# Patient Record
Sex: FEMALE | Race: WHITE | NOT HISPANIC OR LATINO | Employment: FULL TIME | URBAN - METROPOLITAN AREA
[De-identification: names, ages, dates, MRNs, and addresses within clinical notes are randomized per-mention and may not be internally consistent; named-entity substitution may affect disease eponyms.]

---

## 2017-06-21 ENCOUNTER — COMMUNICATION - HEALTHEAST (OUTPATIENT)
Dept: FAMILY MEDICINE | Facility: CLINIC | Age: 47
End: 2017-06-21

## 2017-06-26 ENCOUNTER — COMMUNICATION - HEALTHEAST (OUTPATIENT)
Dept: FAMILY MEDICINE | Facility: CLINIC | Age: 47
End: 2017-06-26

## 2017-06-26 DIAGNOSIS — E03.9 HYPOTHYROIDISM, UNSPECIFIED TYPE: ICD-10-CM

## 2017-06-26 DIAGNOSIS — F41.1 ANXIETY STATE: ICD-10-CM

## 2018-01-12 ENCOUNTER — COMMUNICATION - HEALTHEAST (OUTPATIENT)
Dept: FAMILY MEDICINE | Facility: CLINIC | Age: 48
End: 2018-01-12

## 2018-01-12 DIAGNOSIS — E03.9 HYPOTHYROIDISM, UNSPECIFIED TYPE: ICD-10-CM

## 2018-01-15 ENCOUNTER — COMMUNICATION - HEALTHEAST (OUTPATIENT)
Dept: FAMILY MEDICINE | Facility: CLINIC | Age: 48
End: 2018-01-15

## 2018-02-08 ENCOUNTER — COMMUNICATION - HEALTHEAST (OUTPATIENT)
Dept: FAMILY MEDICINE | Facility: CLINIC | Age: 48
End: 2018-02-08

## 2018-04-26 ENCOUNTER — OFFICE VISIT - HEALTHEAST (OUTPATIENT)
Dept: FAMILY MEDICINE | Facility: CLINIC | Age: 48
End: 2018-04-26

## 2018-04-26 DIAGNOSIS — Z00.00 ROUTINE GENERAL MEDICAL EXAMINATION AT A HEALTH CARE FACILITY: ICD-10-CM

## 2018-04-26 DIAGNOSIS — F41.1 ANXIETY STATE: ICD-10-CM

## 2018-04-26 DIAGNOSIS — F33.1 MODERATE RECURRENT MAJOR DEPRESSION (H): ICD-10-CM

## 2018-04-26 DIAGNOSIS — E03.9 HYPOTHYROIDISM, UNSPECIFIED TYPE: ICD-10-CM

## 2018-04-26 DIAGNOSIS — Z12.31 VISIT FOR SCREENING MAMMOGRAM: ICD-10-CM

## 2018-04-26 DIAGNOSIS — Z86.39 HISTORY OF VITAMIN D DEFICIENCY: ICD-10-CM

## 2018-04-26 LAB
CHOLEST SERPL-MCNC: 217 MG/DL
FASTING STATUS PATIENT QL REPORTED: YES
FASTING STATUS PATIENT QL REPORTED: YES
GLUCOSE BLD-MCNC: 93 MG/DL (ref 70–125)
HDLC SERPL-MCNC: 86 MG/DL
HGB BLD-MCNC: 13.9 G/DL (ref 12–16)
LDLC SERPL CALC-MCNC: 116 MG/DL
T4 FREE SERPL-MCNC: 1.1 NG/DL (ref 0.7–1.8)
TRIGL SERPL-MCNC: 73 MG/DL
TSH SERPL DL<=0.005 MIU/L-ACNC: 1.48 UIU/ML (ref 0.3–5)

## 2018-04-26 ASSESSMENT — MIFFLIN-ST. JEOR: SCORE: 1137.77

## 2018-04-27 LAB
25(OH)D3 SERPL-MCNC: 34.3 NG/ML (ref 30–80)
25(OH)D3 SERPL-MCNC: 34.3 NG/ML (ref 30–80)
HPV SOURCE: NORMAL
HUMAN PAPILLOMA VIRUS 16 DNA: NEGATIVE
HUMAN PAPILLOMA VIRUS 18 DNA: NEGATIVE
HUMAN PAPILLOMA VIRUS FINAL DIAGNOSIS: NORMAL
HUMAN PAPILLOMA VIRUS OTHER HR: NEGATIVE
SPECIMEN DESCRIPTION: NORMAL

## 2018-05-02 ENCOUNTER — RECORDS - HEALTHEAST (OUTPATIENT)
Dept: ADMINISTRATIVE | Facility: OTHER | Age: 48
End: 2018-05-02

## 2018-05-02 ENCOUNTER — COMMUNICATION - HEALTHEAST (OUTPATIENT)
Dept: FAMILY MEDICINE | Facility: CLINIC | Age: 48
End: 2018-05-02

## 2018-05-02 ENCOUNTER — AMBULATORY - HEALTHEAST (OUTPATIENT)
Dept: FAMILY MEDICINE | Facility: CLINIC | Age: 48
End: 2018-05-02

## 2018-05-02 LAB
BKR LAB AP ABNORMAL BLEEDING: NO
BKR LAB AP BIRTH CONTROL/HORMONES: ABNORMAL
BKR LAB AP CERVICAL APPEARANCE: NORMAL
BKR LAB AP GYN ADEQUACY: ABNORMAL
BKR LAB AP GYN INTERPRETATION: ABNORMAL
BKR LAB AP HPV REFLEX: ABNORMAL
BKR LAB AP LMP: ABNORMAL
BKR LAB AP PATIENT STATUS: ABNORMAL
BKR LAB AP PREVIOUS ABNORMAL: ABNORMAL
BKR LAB AP PREVIOUS NORMAL: 2014
HIGH RISK?: NO
INTERPRETING LAB: ABNORMAL
PATH REPORT.COMMENTS IMP SPEC: ABNORMAL
RESULT FLAG (HE HISTORICAL CONVERSION): ABNORMAL

## 2018-05-07 ENCOUNTER — COMMUNICATION - HEALTHEAST (OUTPATIENT)
Dept: FAMILY MEDICINE | Facility: CLINIC | Age: 48
End: 2018-05-07

## 2018-05-17 ENCOUNTER — COMMUNICATION - HEALTHEAST (OUTPATIENT)
Dept: FAMILY MEDICINE | Facility: CLINIC | Age: 48
End: 2018-05-17

## 2018-05-18 ENCOUNTER — COMMUNICATION - HEALTHEAST (OUTPATIENT)
Dept: FAMILY MEDICINE | Facility: CLINIC | Age: 48
End: 2018-05-18

## 2018-05-21 ENCOUNTER — COMMUNICATION - HEALTHEAST (OUTPATIENT)
Dept: FAMILY MEDICINE | Facility: CLINIC | Age: 48
End: 2018-05-21

## 2018-05-21 DIAGNOSIS — F41.1 ANXIETY STATE: ICD-10-CM

## 2018-05-23 ENCOUNTER — COMMUNICATION - HEALTHEAST (OUTPATIENT)
Dept: FAMILY MEDICINE | Facility: CLINIC | Age: 48
End: 2018-05-23

## 2018-06-06 ENCOUNTER — HOSPITAL ENCOUNTER (OUTPATIENT)
Dept: MAMMOGRAPHY | Facility: CLINIC | Age: 48
Discharge: HOME | End: 2018-06-06
Attending: FAMILY MEDICINE

## 2018-06-06 DIAGNOSIS — Z12.31 VISIT FOR SCREENING MAMMOGRAM: ICD-10-CM

## 2018-06-13 ENCOUNTER — COMMUNICATION - HEALTHEAST (OUTPATIENT)
Dept: FAMILY MEDICINE | Facility: CLINIC | Age: 48
End: 2018-06-13

## 2018-06-29 ENCOUNTER — COMMUNICATION - HEALTHEAST (OUTPATIENT)
Dept: FAMILY MEDICINE | Facility: CLINIC | Age: 48
End: 2018-06-29

## 2018-06-29 DIAGNOSIS — E03.9 HYPOTHYROIDISM, UNSPECIFIED TYPE: ICD-10-CM

## 2018-07-23 ENCOUNTER — COMMUNICATION - HEALTHEAST (OUTPATIENT)
Dept: FAMILY MEDICINE | Facility: CLINIC | Age: 48
End: 2018-07-23

## 2018-10-03 ENCOUNTER — COMMUNICATION - HEALTHEAST (OUTPATIENT)
Dept: FAMILY MEDICINE | Facility: CLINIC | Age: 48
End: 2018-10-03

## 2018-10-03 DIAGNOSIS — E03.9 HYPOTHYROIDISM, UNSPECIFIED TYPE: ICD-10-CM

## 2018-10-03 DIAGNOSIS — F41.1 ANXIETY STATE: ICD-10-CM

## 2019-01-09 ENCOUNTER — COMMUNICATION - HEALTHEAST (OUTPATIENT)
Dept: FAMILY MEDICINE | Facility: CLINIC | Age: 49
End: 2019-01-09

## 2019-01-09 ENCOUNTER — AMBULATORY - HEALTHEAST (OUTPATIENT)
Dept: FAMILY MEDICINE | Facility: CLINIC | Age: 49
End: 2019-01-09

## 2019-01-09 DIAGNOSIS — F41.1 ANXIETY STATE: ICD-10-CM

## 2019-02-22 ENCOUNTER — COMMUNICATION - HEALTHEAST (OUTPATIENT)
Dept: FAMILY MEDICINE | Facility: CLINIC | Age: 49
End: 2019-02-22

## 2019-02-22 DIAGNOSIS — E03.9 HYPOTHYROIDISM, UNSPECIFIED TYPE: ICD-10-CM

## 2019-03-01 ENCOUNTER — COMMUNICATION - HEALTHEAST (OUTPATIENT)
Dept: FAMILY MEDICINE | Facility: CLINIC | Age: 49
End: 2019-03-01

## 2019-03-01 DIAGNOSIS — E03.9 HYPOTHYROIDISM, UNSPECIFIED TYPE: ICD-10-CM

## 2019-08-19 ENCOUNTER — COMMUNICATION - HEALTHEAST (OUTPATIENT)
Dept: FAMILY MEDICINE | Facility: CLINIC | Age: 49
End: 2019-08-19

## 2019-08-19 DIAGNOSIS — F41.1 ANXIETY STATE: ICD-10-CM

## 2019-08-19 DIAGNOSIS — E03.9 HYPOTHYROIDISM, UNSPECIFIED TYPE: ICD-10-CM

## 2019-09-09 ENCOUNTER — OFFICE VISIT - HEALTHEAST (OUTPATIENT)
Dept: FAMILY MEDICINE | Facility: CLINIC | Age: 49
End: 2019-09-09

## 2019-09-09 DIAGNOSIS — E03.9 HYPOTHYROIDISM: ICD-10-CM

## 2019-09-09 DIAGNOSIS — Z11.4 SCREENING FOR HUMAN IMMUNODEFICIENCY VIRUS: ICD-10-CM

## 2019-09-09 DIAGNOSIS — F32.5 MAJOR DEPRESSION IN COMPLETE REMISSION (H): ICD-10-CM

## 2019-09-09 DIAGNOSIS — F41.1 ANXIETY STATE: ICD-10-CM

## 2019-09-09 DIAGNOSIS — N95.1 PERIMENOPAUSE: ICD-10-CM

## 2019-09-09 DIAGNOSIS — Z12.31 VISIT FOR SCREENING MAMMOGRAM: ICD-10-CM

## 2019-09-09 DIAGNOSIS — Z11.59 NEED FOR HEPATITIS C SCREENING TEST: ICD-10-CM

## 2019-09-09 DIAGNOSIS — K58.9 IRRITABLE BOWEL SYNDROME, UNSPECIFIED TYPE: ICD-10-CM

## 2019-09-09 LAB
ALBUMIN SERPL-MCNC: 3.8 G/DL (ref 3.5–5)
ALP SERPL-CCNC: 79 U/L (ref 45–120)
ALT SERPL W P-5'-P-CCNC: 22 U/L (ref 0–45)
ANION GAP SERPL CALCULATED.3IONS-SCNC: 9 MMOL/L (ref 5–18)
AST SERPL W P-5'-P-CCNC: 23 U/L (ref 0–40)
BILIRUB SERPL-MCNC: 0.4 MG/DL (ref 0–1)
BUN SERPL-MCNC: 14 MG/DL (ref 8–22)
CALCIUM SERPL-MCNC: 9.9 MG/DL (ref 8.5–10.5)
CHLORIDE BLD-SCNC: 105 MMOL/L (ref 98–107)
CHOLEST SERPL-MCNC: 231 MG/DL
CO2 SERPL-SCNC: 26 MMOL/L (ref 22–31)
CREAT SERPL-MCNC: 0.84 MG/DL (ref 0.6–1.1)
ERYTHROCYTE [DISTWIDTH] IN BLOOD BY AUTOMATED COUNT: 11.9 % (ref 11–14.5)
FASTING STATUS PATIENT QL REPORTED: YES
GFR SERPL CREATININE-BSD FRML MDRD: >60 ML/MIN/1.73M2
GLUCOSE BLD-MCNC: 88 MG/DL (ref 70–125)
HCT VFR BLD AUTO: 40 % (ref 35–47)
HCV AB SERPL QL IA: NEGATIVE
HDLC SERPL-MCNC: 86 MG/DL
HGB BLD-MCNC: 13.7 G/DL (ref 12–16)
HIV 1+2 AB+HIV1 P24 AG SERPL QL IA: NEGATIVE
LDLC SERPL CALC-MCNC: 128 MG/DL
MCH RBC QN AUTO: 32.6 PG (ref 27–34)
MCHC RBC AUTO-ENTMCNC: 34.2 G/DL (ref 32–36)
MCV RBC AUTO: 95 FL (ref 80–100)
PLATELET # BLD AUTO: 254 THOU/UL (ref 140–440)
PMV BLD AUTO: 8.3 FL (ref 7–10)
POTASSIUM BLD-SCNC: 4.4 MMOL/L (ref 3.5–5)
PROT SERPL-MCNC: 6.9 G/DL (ref 6–8)
RBC # BLD AUTO: 4.2 MILL/UL (ref 3.8–5.4)
SODIUM SERPL-SCNC: 140 MMOL/L (ref 136–145)
TRIGL SERPL-MCNC: 83 MG/DL
TSH SERPL DL<=0.005 MIU/L-ACNC: 2.6 UIU/ML (ref 0.3–5)
WBC: 6.6 THOU/UL (ref 4–11)

## 2019-09-09 RX ORDER — DICYCLOMINE HYDROCHLORIDE 10 MG/1
CAPSULE ORAL
Qty: 56 CAPSULE | Refills: 3 | Status: SHIPPED | OUTPATIENT
Start: 2019-09-09 | End: 2021-12-22

## 2019-09-09 ASSESSMENT — ANXIETY QUESTIONNAIRES
7. FEELING AFRAID AS IF SOMETHING AWFUL MIGHT HAPPEN: NOT AT ALL
GAD7 TOTAL SCORE: 1
6. BECOMING EASILY ANNOYED OR IRRITABLE: NOT AT ALL
3. WORRYING TOO MUCH ABOUT DIFFERENT THINGS: NOT AT ALL
1. FEELING NERVOUS, ANXIOUS, OR ON EDGE: NOT AT ALL
2. NOT BEING ABLE TO STOP OR CONTROL WORRYING: NOT AT ALL
5. BEING SO RESTLESS THAT IT IS HARD TO SIT STILL: NOT AT ALL
IF YOU CHECKED OFF ANY PROBLEMS ON THIS QUESTIONNAIRE, HOW DIFFICULT HAVE THESE PROBLEMS MADE IT FOR YOU TO DO YOUR WORK, TAKE CARE OF THINGS AT HOME, OR GET ALONG WITH OTHER PEOPLE: NOT DIFFICULT AT ALL
4. TROUBLE RELAXING: SEVERAL DAYS

## 2019-09-09 ASSESSMENT — PATIENT HEALTH QUESTIONNAIRE - PHQ9
SUM OF ALL RESPONSES TO PHQ QUESTIONS 1-9: 2
SUM OF ALL RESPONSES TO PHQ QUESTIONS 1-9: 0

## 2019-09-09 ASSESSMENT — MIFFLIN-ST. JEOR: SCORE: 1190.79

## 2019-09-10 ENCOUNTER — COMMUNICATION - HEALTHEAST (OUTPATIENT)
Dept: FAMILY MEDICINE | Facility: CLINIC | Age: 49
End: 2019-09-10

## 2019-09-10 DIAGNOSIS — E03.9 ACQUIRED HYPOTHYROIDISM: ICD-10-CM

## 2019-10-04 ENCOUNTER — HOSPITAL ENCOUNTER (OUTPATIENT)
Dept: MAMMOGRAPHY | Facility: CLINIC | Age: 49
Discharge: HOME | End: 2019-10-04
Attending: FAMILY MEDICINE

## 2019-10-04 DIAGNOSIS — Z12.31 VISIT FOR SCREENING MAMMOGRAM: ICD-10-CM

## 2019-10-13 ENCOUNTER — COMMUNICATION - HEALTHEAST (OUTPATIENT)
Dept: FAMILY MEDICINE | Facility: CLINIC | Age: 49
End: 2019-10-13

## 2019-10-17 ENCOUNTER — COMMUNICATION - HEALTHEAST (OUTPATIENT)
Dept: FAMILY MEDICINE | Facility: CLINIC | Age: 49
End: 2019-10-17

## 2019-10-17 DIAGNOSIS — F41.1 ANXIETY STATE: ICD-10-CM

## 2019-10-29 ENCOUNTER — COMMUNICATION - HEALTHEAST (OUTPATIENT)
Dept: FAMILY MEDICINE | Facility: CLINIC | Age: 49
End: 2019-10-29

## 2019-10-29 DIAGNOSIS — E03.9 HYPOTHYROIDISM, UNSPECIFIED TYPE: ICD-10-CM

## 2019-11-14 ENCOUNTER — AMBULATORY - HEALTHEAST (OUTPATIENT)
Dept: LAB | Facility: CLINIC | Age: 49
End: 2019-11-14

## 2019-11-14 DIAGNOSIS — E03.9 ACQUIRED HYPOTHYROIDISM: ICD-10-CM

## 2019-11-14 LAB
T4 FREE SERPL-MCNC: 0.9 NG/DL (ref 0.7–1.8)
TSH SERPL DL<=0.005 MIU/L-ACNC: 2.1 UIU/ML (ref 0.3–5)

## 2019-12-03 ENCOUNTER — COMMUNICATION - HEALTHEAST (OUTPATIENT)
Dept: FAMILY MEDICINE | Facility: CLINIC | Age: 49
End: 2019-12-03

## 2020-01-23 ENCOUNTER — COMMUNICATION - HEALTHEAST (OUTPATIENT)
Dept: FAMILY MEDICINE | Facility: CLINIC | Age: 50
End: 2020-01-23

## 2020-01-30 ENCOUNTER — OFFICE VISIT - HEALTHEAST (OUTPATIENT)
Dept: FAMILY MEDICINE | Facility: CLINIC | Age: 50
End: 2020-01-30

## 2020-01-30 DIAGNOSIS — K58.9 IRRITABLE BOWEL SYNDROME, UNSPECIFIED TYPE: ICD-10-CM

## 2020-01-30 DIAGNOSIS — Z79.899 HIGH RISK MEDICATION USE: ICD-10-CM

## 2020-01-30 DIAGNOSIS — M54.9 BACK PAIN, UNSPECIFIED BACK LOCATION, UNSPECIFIED BACK PAIN LATERALITY, UNSPECIFIED CHRONICITY: ICD-10-CM

## 2020-01-30 DIAGNOSIS — F41.1 ANXIETY STATE: ICD-10-CM

## 2020-01-30 DIAGNOSIS — R53.82 CHRONIC FATIGUE: ICD-10-CM

## 2020-01-30 DIAGNOSIS — E03.9 ACQUIRED HYPOTHYROIDISM: ICD-10-CM

## 2020-01-30 LAB
ALBUMIN SERPL-MCNC: 3.9 G/DL (ref 3.5–5)
ALP SERPL-CCNC: 60 U/L (ref 45–120)
ALT SERPL W P-5'-P-CCNC: 13 U/L (ref 0–45)
AST SERPL W P-5'-P-CCNC: 17 U/L (ref 0–40)
BILIRUB DIRECT SERPL-MCNC: 0.1 MG/DL
BILIRUB SERPL-MCNC: 0.4 MG/DL (ref 0–1)
PROT SERPL-MCNC: 7.2 G/DL (ref 6–8)
T4 FREE SERPL-MCNC: 0.8 NG/DL (ref 0.7–1.8)
TSH SERPL DL<=0.005 MIU/L-ACNC: 5.81 UIU/ML (ref 0.3–5)

## 2020-01-30 ASSESSMENT — ANXIETY QUESTIONNAIRES
7. FEELING AFRAID AS IF SOMETHING AWFUL MIGHT HAPPEN: NOT AT ALL
1. FEELING NERVOUS, ANXIOUS, OR ON EDGE: MORE THAN HALF THE DAYS
4. TROUBLE RELAXING: MORE THAN HALF THE DAYS
5. BEING SO RESTLESS THAT IT IS HARD TO SIT STILL: MORE THAN HALF THE DAYS
6. BECOMING EASILY ANNOYED OR IRRITABLE: SEVERAL DAYS
IF YOU CHECKED OFF ANY PROBLEMS ON THIS QUESTIONNAIRE, HOW DIFFICULT HAVE THESE PROBLEMS MADE IT FOR YOU TO DO YOUR WORK, TAKE CARE OF THINGS AT HOME, OR GET ALONG WITH OTHER PEOPLE: SOMEWHAT DIFFICULT
3. WORRYING TOO MUCH ABOUT DIFFERENT THINGS: MORE THAN HALF THE DAYS
GAD7 TOTAL SCORE: 10
2. NOT BEING ABLE TO STOP OR CONTROL WORRYING: SEVERAL DAYS

## 2020-01-30 ASSESSMENT — PATIENT HEALTH QUESTIONNAIRE - PHQ9: SUM OF ALL RESPONSES TO PHQ QUESTIONS 1-9: 12

## 2020-01-31 LAB
25(OH)D3 SERPL-MCNC: 55.9 NG/ML (ref 30–80)
25(OH)D3 SERPL-MCNC: 55.9 NG/ML (ref 30–80)

## 2020-02-05 ENCOUNTER — AMBULATORY - HEALTHEAST (OUTPATIENT)
Dept: FAMILY MEDICINE | Facility: CLINIC | Age: 50
End: 2020-02-05

## 2020-02-05 DIAGNOSIS — E03.9 ACQUIRED HYPOTHYROIDISM: ICD-10-CM

## 2020-02-07 ENCOUNTER — COMMUNICATION - HEALTHEAST (OUTPATIENT)
Dept: FAMILY MEDICINE | Facility: CLINIC | Age: 50
End: 2020-02-07

## 2020-02-07 DIAGNOSIS — K58.9 IRRITABLE BOWEL SYNDROME, UNSPECIFIED TYPE: ICD-10-CM

## 2020-02-07 DIAGNOSIS — F41.1 ANXIETY STATE: ICD-10-CM

## 2020-02-07 DIAGNOSIS — M54.9 BACK PAIN, UNSPECIFIED BACK LOCATION, UNSPECIFIED BACK PAIN LATERALITY, UNSPECIFIED CHRONICITY: ICD-10-CM

## 2020-02-07 DIAGNOSIS — E03.9 ACQUIRED HYPOTHYROIDISM: ICD-10-CM

## 2020-02-07 DIAGNOSIS — R53.82 CHRONIC FATIGUE: ICD-10-CM

## 2020-03-15 ENCOUNTER — VIRTUAL VISIT (OUTPATIENT)
Dept: FAMILY MEDICINE | Facility: OTHER | Age: 50
End: 2020-03-15

## 2020-03-15 ENCOUNTER — OFFICE VISIT - HEALTHEAST (OUTPATIENT)
Dept: FAMILY MEDICINE | Facility: CLINIC | Age: 50
End: 2020-03-15

## 2020-03-15 DIAGNOSIS — R05.9 COUGH: ICD-10-CM

## 2020-03-19 NOTE — PROGRESS NOTES
"Date: 03/15/2020 13:38:17  Clinician: Luis Alfredo Rasmussen  Clinician NPI: [id number]  Patient: Sil Mancuso  Patient : 1970  Patient Address: [address] [place] [address]  Patient Phone: [phone]  Visit Protocol: URI  Patient Summary:  Sil is a 49 year old ( : 1970 ) female who initiated a Visit for COVID-19 (Coronavirus) evaluation and screening. When asked the question \"Please sign me up to receive news, health information and promotions. \", Sil responded \"No\".    Sil states her symptoms started today.   Her symptoms consist of malaise, a headache, rhinitis, myalgia, a sore throat, a cough, and nasal congestion.   Symptom details     Nasal secretions: The color of her mucus is yellow and clear.    Cough: Sil coughs a few times an hour and her cough is more bothersome at night. Phlegm does not come into her throat when she coughs. She does not believe her cough is caused by post-nasal drip.     Sore throat: Sil reports having mild throat pain (1-3 on a 10 point pain scale), does not have exudate on her tonsils, and can swallow liquids. The lymph nodes in her neck are not enlarged. A rash has not appeared on the skin since the sore throat started.     Headache: She states the headache is mild (1-3 on a 10 point pain scale).      Sil denies having ear pain, fever, enlarged lymph nodes, facial pain or pressure, chills, wheezing, and teeth pain. She also denies having a sinus infection within the past year, having recent facial or sinus surgery in the past 60 days, and taking antibiotic medication for the symptoms. She is not experiencing dyspnea.   Precipitating events  Sil is not sure if she has been exposed to someone with strep throat. She has recently been exposed to someone with influenza. Sil has not been in close contact with any high risk individuals.   Pertinent COVID-19 (Coronavirus) information  Sil has traveled internationally or to the areas where " COVID-19 (Coronavirus) is widespread in the last 14 days before the start of her symptoms. Countries or locations traveled as reported by the patient (free text): On a cruise to [facility] and [facility]   Sil has had close contact with a suspected or laboratory-confirmed COVID-19 patient within 14 days of symptom onset.   Sil is not a healthcare worker and does not work in a healthcare facility.   Pertinent medical history  Sil does not get yeast infections when she takes antibiotics.   Sil does not need a return to work/school note.   Weight: 125 lbs   Sil does not smoke or use smokeless tobacco.   She denies pregnancy and denies breastfeeding. She does not menstruate.   Weight: 125 lbs    MEDICATIONS: Synthroid oral, Zoloft oral, ALLERGIES: NKDA  Clinician Response:  Dear Sil,      Dear Sil Mancuso,  Based on the information you have provided, it is recommended that you go to one of our designated Corona Virus 19 testing centers to get a test done from your car. To do this follow these instructions:  You should go to one of our dedicated testing centers as soon as possible during the hours below at one of these locations:   Walk-in Care: [facility] at [address] [address], [place] [address]. Hours: M-F 7am - 6pm, Sat-Sun 8am -- 3pm   [facility] at [address] [address]. Hours: Every Day 9am -- 7pm  Walk-in Care: [facility] at [address] [place] [address]. Hours: M-F 7am - 6pm, Sat-Sun 8am -- 3pm  [address] [place] [address]. Hours: M-F 11am -- 7pm, Sat-Sun 9am-4pm   What to expect:   When you arrive please come park in the parking lot.  Call [phone] and let them know which of the four clinics you are at, description of your car and where you are parked. Mention you did an [facility] visit and were sent for testing.  They will add you to the queue to get your test  (you will stay in your car the entire time).  On that phone call you will give them the information to register your for the visit.  You will then be met by a provider who will perform a brief assessment in your car and collect samples to send for Corona Virus 19, influenza and possibly RSV.  You will be given patient information about respiratory illnesses and instructions. You with the results if you are not on mychart.   Isolate Yourself:   Isolate yourself while traveling.  Do Not allow any visitors within 6 feet.  Do Not go to work or school.  Do Not go to Religion,  centers, shopping, or other public places.  Do Not shake hands.  Avoid close contact with others (hugging, kissing).  Protect Others:  Cover Your Mouth and Nose with a mask, disposable tissue or wash cloth to avoid spreading germs to others.  Wash your hands and face frequently with soap and water   Fever Medicines:   For fever relief, take acetaminophen or ibuprofen.  Treat fevers above 101deg F (38.3deg C) to lower fevers and make you more comfortable.   Acetaminophen (e.g., Tylenol): Take 650 mg (two 325 mg pills) by mouth every 4-6 hours as needed of regular strength Tylenol or 1,000 mg (two 500 mg pills) every 8 hours as needed of Extra Strength Tylenol.   Ibuprofen (e.g., Motrin, Advil): Take 400 mg (two 200 mg pills) by mouth every 6 hours as needed.   Acetaminophen is thought to be safer than ibuprofen or naproxen for people over 65 years old. Acetaminophen is in many OTC and prescription medicines. It might be in more than one medicine that you are taking. You need to be careful and not take an overdose. Before taking any medicine, read all the instructions on the package.  Caution -NSAIDs (e.g., ibuprofen, naproxen): Do not take nonsteroidal anti-inflammatory drugs (NSAIDs) if you have stomach problems, kidney disease, heart failure, or other contraindications to using this type of medicine. Do not take NSAID medicines for over 7  days without consulting your PCP. Do not take NSAID medicines if you are pregnant. Do not take NSAID medicines if you are also taking blood thinners.   Call Back If: Breathing difficulty develops or you become worse.  Thank you for limiting contact with others, wearing a simple mask to cover your cough, practice good hand hygiene habits and accessing our virtual services where possible to limit the spread of this virus.  For more information about COVID19 and options for caring for yourself at home, please visit the CDC website at https://www.cdc.gov/coronavirus/2019-ncov/about/steps-when-sick.html  For more options for care at [facility], please visit our website at [email or web address]    COVID-19 (Coronavirus) General Information  With the increase in the number of COVID-19 (Coronavirus) cases, we understand you may have some questions. Below is some helpful information on COVID-19 (Coronavirus).  How can I protect myself and others from the COVID-19 (Coronavirus)?  Because there is currently no vaccine to prevent infection, the best way to protect yourself is to avoid being exposed to this virus. Put distance between yourself and other people if COVID-19 (Coronavirus) is spreading in your community. The virus is thought to spread mainly from person-to-person.     Between people who are in close contact with one another (within about 6 about) for prolonged period (10 minutes or longer).    Through respiratory droplets produced when an infected person coughs or sneezes.     The CDC recommends the following additional steps to protect yourself and others:     Wash your hands often with soap and water for at least 20 seconds, especially after blowing your nose, coughing, or sneezing; going to the bathroom; and before eating or preparing food.  Use an alcohol-based hand  that contains at least 60 percent alcohol if soap and water are not available.        Avoid touching  your eyes, nose and mouth with unwashed hands.    Avoid close contact with people who are sick.    Stay home when you are sick.    Cover your cough or sneeze with a tissue, then throw the tissue in the trash.    Clean and disinfect frequently touched objects and surfaces.     You can help stop COVID-19 (Coronavirus) by knowing the signs and symptoms:     Fever    Cough    Shortness of breath     Contact your healthcare provider if   Develop symptoms   AND   Have been in close contact with a person known to have COVID-19 (Coronavirus) or live in or have recently traveled from an area with ongoing spread of COVID-19 (Coronavirus). Call ahead before you go to a doctor's office or emergency room. Tell them about your recent travel and your symptoms.   For the most up to date information, visit the CDC's website.  Steps to help prevent the spread of COVID-19 (Coronavirus) if you are sick  If you are sick with COVID-19 (Coronavirus) or suspect you are infected with the virus that causes COVID-19 (Coronavirus), follow the steps below to help prevent the disease from spreading&nbsp;to people in your home and community.     Stay home except to get medical care. Home isolation may be started in consultation with your healthcare clinician.    Separate yourself from other people and animals in your home.    Call ahead before visiting your doctor if you have a medical appointment.    Wear a facemask when you are around other people.    Cover your cough and sneezes.    Clean your hands often.    Avoid sharing personal household items.    Clean and disinfect frequently touched objects and surfaces everyday.    You will need to have someone drop off medications or household supplies (if needed) at your house without coming inside or in contact with you or others living in your house.    Monitor your symptoms and seek prompt medical care if your illness is worsening (e.g. Difficulty breathing).    Discontinue home isolation only in  "consultation with your healthcare provider.     For more detailed and up to date information on what to do if you are sick, visit this link: What to Do If You Are Sick With Coronavirus Disease 2019 (COVID-19).  Do I need to be tested for COVID-19 (Coronavirus)?     At this time, the limited number of tests available are controlled by the state and local health departments and are being reserved for more seriously ill patients, those with known exposure to confirmed patients, and those with recent travel (within 14 days) to countries with high rates of COVID-19 (Coronavirus).    Decisions on which patients receive testing will be based on the local spread of COVID-19 (Coronavirus) as well as the symptoms. Your healthcare provider will make the final decision on whether you should be tested.    In the meantime, if you have concerns that you may have been exposed, it is reasonable to practice \"social distancing.\"&nbsp; If you are ill with a cold or flu-like illness, please monitor your symptoms and reach out to your healthcare provider if your symptoms worsen.    For more up to date information, visit this link: COVID-19 (Coronavirus) Frequently Asked Questions and Answers.      Diagnosis: Cough  Diagnosis ICD: R05  "

## 2020-03-20 ENCOUNTER — COMMUNICATION - HEALTHEAST (OUTPATIENT)
Dept: FAMILY MEDICINE | Facility: CLINIC | Age: 50
End: 2020-03-20

## 2020-03-20 LAB
COVID-19 VIRUS PCR RESULT FROM MDH: NEGATIVE
SPECIMEN STATUS: NORMAL

## 2020-03-26 ENCOUNTER — COMMUNICATION - HEALTHEAST (OUTPATIENT)
Dept: FAMILY MEDICINE | Facility: CLINIC | Age: 50
End: 2020-03-26

## 2020-03-26 DIAGNOSIS — E03.9 HYPOTHYROIDISM, UNSPECIFIED TYPE: ICD-10-CM

## 2020-07-01 ENCOUNTER — COMMUNICATION - HEALTHEAST (OUTPATIENT)
Dept: FAMILY MEDICINE | Facility: CLINIC | Age: 50
End: 2020-07-01

## 2020-07-01 DIAGNOSIS — E03.9 ACQUIRED HYPOTHYROIDISM: ICD-10-CM

## 2020-09-02 ENCOUNTER — COMMUNICATION - HEALTHEAST (OUTPATIENT)
Dept: FAMILY MEDICINE | Facility: CLINIC | Age: 50
End: 2020-09-02

## 2020-09-02 DIAGNOSIS — Z78.9 USES BIRTH CONTROL: ICD-10-CM

## 2020-09-21 ENCOUNTER — COMMUNICATION - HEALTHEAST (OUTPATIENT)
Dept: FAMILY MEDICINE | Facility: CLINIC | Age: 50
End: 2020-09-21

## 2020-09-21 DIAGNOSIS — F41.1 ANXIETY STATE: ICD-10-CM

## 2020-10-30 ENCOUNTER — OFFICE VISIT - HEALTHEAST (OUTPATIENT)
Dept: FAMILY MEDICINE | Facility: CLINIC | Age: 50
End: 2020-10-30

## 2020-10-30 DIAGNOSIS — K58.9 IRRITABLE BOWEL SYNDROME, UNSPECIFIED TYPE: ICD-10-CM

## 2020-10-30 DIAGNOSIS — F33.0 MILD RECURRENT MAJOR DEPRESSION (H): ICD-10-CM

## 2020-10-30 DIAGNOSIS — Z12.31 VISIT FOR SCREENING MAMMOGRAM: ICD-10-CM

## 2020-10-30 DIAGNOSIS — Z00.00 ROUTINE GENERAL MEDICAL EXAMINATION AT A HEALTH CARE FACILITY: ICD-10-CM

## 2020-10-30 DIAGNOSIS — E03.9 ACQUIRED HYPOTHYROIDISM: ICD-10-CM

## 2020-10-30 DIAGNOSIS — F41.1 ANXIETY STATE: ICD-10-CM

## 2020-10-30 LAB
CHOLEST SERPL-MCNC: 257 MG/DL
FASTING STATUS PATIENT QL REPORTED: NO
FASTING STATUS PATIENT QL REPORTED: NO
GLUCOSE BLD-MCNC: 84 MG/DL (ref 70–125)
HDLC SERPL-MCNC: 92 MG/DL
LDLC SERPL CALC-MCNC: 141 MG/DL
T4 FREE SERPL-MCNC: 0.8 NG/DL (ref 0.7–1.8)
TRIGL SERPL-MCNC: 121 MG/DL
TSH SERPL DL<=0.005 MIU/L-ACNC: 10.61 UIU/ML (ref 0.3–5)

## 2020-10-30 ASSESSMENT — MIFFLIN-ST. JEOR: SCORE: 1180.31

## 2020-10-30 ASSESSMENT — PATIENT HEALTH QUESTIONNAIRE - PHQ9: SUM OF ALL RESPONSES TO PHQ QUESTIONS 1-9: 5

## 2020-11-01 ENCOUNTER — COMMUNICATION - HEALTHEAST (OUTPATIENT)
Dept: FAMILY MEDICINE | Facility: CLINIC | Age: 50
End: 2020-11-01

## 2020-11-01 DIAGNOSIS — Z78.9 USES BIRTH CONTROL: ICD-10-CM

## 2020-11-02 ENCOUNTER — COMMUNICATION - HEALTHEAST (OUTPATIENT)
Dept: FAMILY MEDICINE | Facility: CLINIC | Age: 50
End: 2020-11-02

## 2020-11-02 ENCOUNTER — AMBULATORY - HEALTHEAST (OUTPATIENT)
Dept: FAMILY MEDICINE | Facility: CLINIC | Age: 50
End: 2020-11-02

## 2020-11-02 DIAGNOSIS — E03.9 ACQUIRED HYPOTHYROIDISM: ICD-10-CM

## 2020-11-04 RX ORDER — ETONOGESTREL AND ETHINYL ESTRADIOL VAGINAL RING .015; .12 MG/D; MG/D
RING VAGINAL
Qty: 3 EACH | Refills: 3 | Status: SHIPPED | OUTPATIENT
Start: 2020-11-04 | End: 2021-09-02

## 2020-12-10 ENCOUNTER — COMMUNICATION - HEALTHEAST (OUTPATIENT)
Dept: FAMILY MEDICINE | Facility: CLINIC | Age: 50
End: 2020-12-10

## 2020-12-10 ENCOUNTER — OFFICE VISIT - HEALTHEAST (OUTPATIENT)
Dept: FAMILY MEDICINE | Facility: CLINIC | Age: 50
End: 2020-12-10

## 2020-12-10 DIAGNOSIS — F51.01 PRIMARY INSOMNIA: ICD-10-CM

## 2020-12-18 ENCOUNTER — HOSPITAL ENCOUNTER (OUTPATIENT)
Dept: MAMMOGRAPHY | Facility: CLINIC | Age: 50
Discharge: HOME | End: 2020-12-18
Attending: FAMILY MEDICINE

## 2020-12-18 DIAGNOSIS — Z12.31 VISIT FOR SCREENING MAMMOGRAM: ICD-10-CM

## 2020-12-23 ENCOUNTER — COMMUNICATION - HEALTHEAST (OUTPATIENT)
Dept: FAMILY MEDICINE | Facility: CLINIC | Age: 50
End: 2020-12-23

## 2020-12-23 DIAGNOSIS — F51.01 PRIMARY INSOMNIA: ICD-10-CM

## 2020-12-23 RX ORDER — HYDROXYZINE HYDROCHLORIDE 25 MG/1
TABLET, FILM COATED ORAL
Qty: 180 TABLET | Refills: 2 | Status: SHIPPED | OUTPATIENT
Start: 2020-12-23 | End: 2021-09-10

## 2021-02-08 ENCOUNTER — AMBULATORY - HEALTHEAST (OUTPATIENT)
Dept: LAB | Facility: CLINIC | Age: 51
End: 2021-02-08

## 2021-02-08 DIAGNOSIS — E03.9 ACQUIRED HYPOTHYROIDISM: ICD-10-CM

## 2021-02-08 LAB
T4 FREE SERPL-MCNC: 1 NG/DL (ref 0.7–1.8)
TSH SERPL DL<=0.005 MIU/L-ACNC: 1.77 UIU/ML (ref 0.3–5)

## 2021-02-09 ENCOUNTER — COMMUNICATION - HEALTHEAST (OUTPATIENT)
Dept: FAMILY MEDICINE | Facility: CLINIC | Age: 51
End: 2021-02-09

## 2021-02-09 DIAGNOSIS — E03.9 ACQUIRED HYPOTHYROIDISM: ICD-10-CM

## 2021-02-09 DIAGNOSIS — F51.01 PRIMARY INSOMNIA: ICD-10-CM

## 2021-02-09 RX ORDER — HYDROXYZINE PAMOATE 50 MG/1
50 CAPSULE ORAL
Qty: 90 CAPSULE | Refills: 3 | Status: SHIPPED | OUTPATIENT
Start: 2021-02-09 | End: 2022-02-14

## 2021-02-09 RX ORDER — LEVOTHYROXINE SODIUM 150 UG/1
150 TABLET ORAL DAILY
Qty: 90 TABLET | Refills: 3 | Status: SHIPPED | OUTPATIENT
Start: 2021-02-09 | End: 2022-02-22

## 2021-03-02 ENCOUNTER — COMMUNICATION - HEALTHEAST (OUTPATIENT)
Dept: FAMILY MEDICINE | Facility: CLINIC | Age: 51
End: 2021-03-02

## 2021-03-02 DIAGNOSIS — F41.1 ANXIETY STATE: ICD-10-CM

## 2021-03-03 RX ORDER — SERTRALINE HYDROCHLORIDE 100 MG/1
TABLET, FILM COATED ORAL
Qty: 135 TABLET | Refills: 2 | Status: SHIPPED | OUTPATIENT
Start: 2021-03-03 | End: 2021-11-07

## 2021-05-26 ENCOUNTER — RECORDS - HEALTHEAST (OUTPATIENT)
Dept: ADMINISTRATIVE | Facility: CLINIC | Age: 51
End: 2021-05-26

## 2021-05-26 ASSESSMENT — PATIENT HEALTH QUESTIONNAIRE - PHQ9: SUM OF ALL RESPONSES TO PHQ QUESTIONS 1-9: 2

## 2021-05-27 ASSESSMENT — PATIENT HEALTH QUESTIONNAIRE - PHQ9
SUM OF ALL RESPONSES TO PHQ QUESTIONS 1-9: 12
SUM OF ALL RESPONSES TO PHQ QUESTIONS 1-9: 5

## 2021-05-28 ENCOUNTER — RECORDS - HEALTHEAST (OUTPATIENT)
Dept: ADMINISTRATIVE | Facility: CLINIC | Age: 51
End: 2021-05-28

## 2021-05-28 ASSESSMENT — ANXIETY QUESTIONNAIRES
GAD7 TOTAL SCORE: 1
GAD7 TOTAL SCORE: 10

## 2021-05-31 NOTE — TELEPHONE ENCOUNTER
RN cannot approve Refill Request    RN can NOT refill this medication Protocol failed and NO refill given. Last office visit: 12/14/2016 Irma Lerner MD Last Physical: 4/26/2018 Last MTM visit: Visit date not found Last visit same specialty: 12/14/2016 Irma Lerner MD.  Next visit within 3 mo: Visit date not found  Next physical within 3 mo: Visit date not found      [place], [facility] Triage/Med Refill 8/19/2019    Requested Prescriptions   Pending Prescriptions Disp Refills     sertraline (ZOLOFT) 100 MG tablet [Pharmacy Med Name: SERTRALINE HCL 100MG TABLET] 135 tablet 0     Sig: TAKE 1 AND 1/2 TABLETS BY  MOUTH DAILY       [facility] Refill Protocol  Failed - 8/19/2019  7:39 AM        Failed - PCP or prescribing provider visit in last year     Last office visit with prescriber/PCP: 12/14/2016 Irma Lerner MD OR same dept: Visit date not found OR same specialty: 12/14/2016 Irma Lerner MD  Last physical: 4/26/2018 Last MTM visit: Visit date not found   Next visit within 3 mo: Visit date not found  Next physical within 3 mo: Visit date not found  Prescriber OR PCP: Irma Lerner MD  Last diagnosis associated with med order: 1. Anxiety  - sertraline (ZOLOFT) 100 MG tablet [Pharmacy Med Name: SERTRALINE HCL 100MG TABLET]; TAKE 1 AND 1/2 TABLETS BY  MOUTH DAILY  Dispense: 135 tablet; Refill: 0    2. Hypothyroidism, unspecified type  - SYNTHROID 112 mcg tablet [Pharmacy Med Name: SYNTHROID  0.112MG  TAB]; TAKE 1 TABLET BY MOUTH  DAILY  Dispense: 90 tablet; Refill: 0    If protocol passes may refill for 12 months if within 3 months of last provider visit (or a total of 15 months).             SYNTHROID 112 mcg tablet [Pharmacy Med Name: SYNTHROID  0.112MG  TAB] 90 tablet 0     Sig: TAKE 1 TABLET BY MOUTH  DAILY       Thyroid Hormones Protocol Failed - 8/19/2019  7:39 AM        Failed - Provider visit in past 12 months or next 3 months     Last office visit with prescriber/PCP: 12/14/2016 Yann  MD Irma OR same dept: Visit date not found OR same specialty: 12/14/2016 Irma Lerner MD  Last physical: 4/26/2018 Last MTM visit: Visit date not found   Next visit within 3 mo: Visit date not found  Next physical within 3 mo: Visit date not found  Prescriber OR PCP: Irma Lernre MD  Last diagnosis associated with med order: 1. Anxiety  - sertraline (ZOLOFT) 100 MG tablet [Pharmacy Med Name: SERTRALINE HCL 100MG TABLET]; TAKE 1 AND 1/2 TABLETS BY  MOUTH DAILY  Dispense: 135 tablet; Refill: 0    2. Hypothyroidism, unspecified type  - SYNTHROID 112 mcg tablet [Pharmacy Med Name: SYNTHROID  0.112MG  TAB]; TAKE 1 TABLET BY MOUTH  DAILY  Dispense: 90 tablet; Refill: 0    If protocol passes may refill for 12 months if within 3 months of last provider visit (or a total of 15 months).             Failed - TSH on file in past 12 months for patient age 12 & older     TSH   Date Value Ref Range Status   04/26/2018 1.48 0.30 - 5.00 uIU/mL Final

## 2021-05-31 NOTE — TELEPHONE ENCOUNTER
Refills sent. Pt is due for med check and labs. Please call to schedule.  She will need this before further refills can be sent

## 2021-06-01 VITALS — BODY MASS INDEX: 22.5 KG/M2 | WEIGHT: 122.25 LBS | HEIGHT: 62 IN

## 2021-06-01 NOTE — PROGRESS NOTES
Assessment/Plan:     1. Hypothyroidism  Thyroid Stimulating Hormone (TSH)    Lipid Cascade FASTING   2. Visit for screening mammogram  Mammo Screening Bilateral   3. Screening for human immunodeficiency virus  HIV Antigen/Antibody Screening Cascade   4. Contraception  etonogestrel-ethinyl estradiol (NUVARING) 0.12-0.015 mg/24 hr vaginal ring   5. Need for hepatitis C screening test  Hepatitis C Antibody (Anti-HCV)   6. Irritable bowel syndrome, unspecified type  Comprehensive Metabolic Panel    HM2(CBC w/o Differential)    dicyclomine (BENTYL) 10 MG capsule   7. Perimenopause     8. Anxiety     9. Major depression in complete remission (H)         Diagnoses and all orders for this visit:    Hypothyroidism  -     Thyroid Stimulating Hormone (TSH)  -     Lipid Cascade FASTING  -Continue Synthroid.  Will adjust dose if needed based on results    Visit for screening mammogram  -     Mammo Screening Bilateral; Future; Expected date: 09/09/2019    Screening for human immunodeficiency virus  -     HIV Antigen/Antibody Screening Cascade    Contraception  -     etonogestrel-ethinyl estradiol (NUVARING) 0.12-0.015 mg/24 hr vaginal ring; INSERT 1 RING VAGINALLY  EVERY 28 DAYS; LEAVE RING  IN PLACE FOR 3 CONSECUTIVE  WEEKS, THEN REMOVE FOR 1  WEEK.  Dispense: 3 each; Refill: 3  - Doing well on current form of contraception    Need for hepatitis C screening test  -     Hepatitis C Antibody (Anti-HCV)    Irritable bowel syndrome, unspecified type  -     Comprehensive Metabolic Panel  -     HM2(CBC w/o Differential)  -     dicyclomine (BENTYL) 10 MG capsule; Take 1 capsule by mouth every 6 hours for up to 14 days  Dispense: 56 capsule; Refill: 3  -We will check TSH and adjust dose of Synthroid if needed.  Continue with healthy diet and regular exercise.  Try prescription for dicyclomine on as-needed basis for flareups of IBS.  Check hemogram and comprehensive metabolic panel today.  Consider follow-up with GI specialist in  future if needed    Perimenopause  Counseled on common symptoms associated with perimenopause    Anxiety  Doing well on sertraline 150 mg daily    Major depression in complete remission (H)  Doing well on sertraline 150 mg daily    Need for influenza vaccine  -     Influenza,Seasonal,Quad,INJ =/>6months  -Counseled on vaccine and side effects             The following are part of a depression follow up plan for the patient:  mental health care management    Subjective:      Sil Mancuso is a 48 y.o. female who comes in today for medication check and refills.  Reviewed her health history.  No significant changes in her health since she was last seen in April 2018.  She is due for mammogram.  She has hypothyroidism.  She continues on Synthroid.  She has history of anxiety, depression and irritable bowel syndrome.  She is on sertraline 150 mg daily for anxiety and depression.  This was a medication change that we made since her last visit based on the results of iNEWiT testing.  She reports that the sertraline is working very well for her.  She is comfortable at her current dose and would like to continue this.  No adverse side effects.  States that life is going well.  She enjoys her job.  She is  and her marriage is fine.  Children are all doing well.  She has been experiencing more flareups of irritable bowel syndrome over the past 6 months and she is not really sure why.  She started a probiotic supplement about 4 5 years ago and that has really made a difference as far as controlling her IBS symptoms.  She does have heartburn and acid reflux.  She has not had blood in stools but stools have been a little dark in appearance.  States that the flareups are so severe that sometimes she is not able to work.  She did have an upper endoscopy in 2014.  At that time she was given a prescription for Bentyl.  She does not recall that she took that medication for very long.  She wonders about new options that  may not be available for management of IBS.  She continues NuvaRing for contraception.  This works well for her.  She did stop it for a few months to see if she was menopausal but she started getting periods again.  She has noticed a little bit more difficulty with weight management.  She exercises regularly and tries very hard to eat a healthy diet and manage her weight.  She has biometric screening form that she would like completed today.  She is fasting.  No other concerns or questions.  Review of systems is assessed and is otherwise negative.    Current Outpatient Medications   Medication Sig Dispense Refill     etonogestrel-ethinyl estradiol (NUVARING) 0.12-0.015 mg/24 hr vaginal ring INSERT 1 RING VAGINALLY  EVERY 28 DAYS; LEAVE RING  IN PLACE FOR 3 CONSECUTIVE  WEEKS, THEN REMOVE FOR 1  WEEK. 3 each 3     Lactobacillus rhamnosus GG (CULTURELLE) 10-15 Billion cell capsule Take 1 capsule by mouth daily.       sertraline (ZOLOFT) 100 MG tablet TAKE 1 AND 1/2 TABLETS BY  MOUTH DAILY 135 tablet 0     SYNTHROID 112 mcg tablet TAKE 1 TABLET BY MOUTH  DAILY 90 tablet 0     dicyclomine (BENTYL) 10 MG capsule Take 1 capsule by mouth every 6 hours for up to 14 days 56 capsule 3     No current facility-administered medications for this visit.        Past Medical History, Family History, and Social History reviewed.  Past Medical History:   Diagnosis Date     Anxiety      Depression      Disease of thyroid gland      Hypertension 9/2017    Stage 1     Past Surgical History:   Procedure Laterality Date     LUMBAR LAMINECTOMY       SPINE SURGERY       Patient has no known allergies.  Family History   Problem Relation Age of Onset     Mental illness Son         ADHD     Cancer Mother         lung     Alcohol abuse Father      Mental illness Father         suicide     Depression Father      Early death Father         Suicide     Alcohol abuse Sister      Mental illness Sister      Alcohol abuse Sister      Mental illness  Sister      Arthritis Maternal Grandfather      Depression Sister      Depression Sister      Diabetes Paternal Grandmother      Heart disease Paternal Grandfather      Mental illness Son      Social History     Socioeconomic History     Marital status:      Spouse name: Not on file     Number of children: Not on file     Years of education: Not on file     Highest education level: Not on file   Occupational History     Not on file   Social Needs     Financial resource strain: Not on file     Food insecurity:     Worry: Not on file     Inability: Not on file     Transportation needs:     Medical: Not on file     Non-medical: Not on file   Tobacco Use     Smoking status: Never Smoker     Smokeless tobacco: Never Used   Substance and Sexual Activity     Alcohol use: Yes     Alcohol/week: 2.4 oz     Types: 4 Cans of beer per week     Comment: occ     Drug use: No     Sexual activity: Yes     Partners: Male     Birth control/protection: Inserts   Lifestyle     Physical activity:     Days per week: Not on file     Minutes per session: Not on file     Stress: Not on file   Relationships     Social connections:     Talks on phone: Not on file     Gets together: Not on file     Attends Mandaeism service: Not on file     Active member of club or organization: Not on file     Attends meetings of clubs or organizations: Not on file     Relationship status: Not on file     Intimate partner violence:     Fear of current or ex partner: Not on file     Emotionally abused: Not on file     Physically abused: Not on file     Forced sexual activity: Not on file   Other Topics Concern     Not on file   Social History Narrative     Not on file         Review of systems is as stated in HPI, and the remainder of the 10 system review is otherwise negative.    Objective:     Vitals:    09/09/19 0723   BP: 122/76   Patient Site: Left Arm   Patient Position: Sitting   Pulse: 75   Temp: 98.4  F (36.9  C)   TempSrc: Oral   SpO2: 97%  "  Weight: 132 lb 3 oz (60 kg)   Height: 5' 2.5\" (1.588 m)    Body mass index is 23.79 kg/m .    General appearance: alert, appears stated age and cooperative  Head: Normocephalic, without obvious abnormality, atraumatic  Lungs: clear to auscultation bilaterally  Abdomen: soft, non-tender; bowel sounds normal; no masses,  no organomegaly  Extremities: extremities normal, atraumatic, no cyanosis or edema  Neurologic: Grossly normal  Psych: mood appropriate, affect normal      GIOVANNI-7: 1  PHQ-9: 2    This note has been dictated using voice recognition software. Any grammatical or context distortions are unintentional and inherent to the the software.       "

## 2021-06-02 ENCOUNTER — RECORDS - HEALTHEAST (OUTPATIENT)
Dept: ADMINISTRATIVE | Facility: CLINIC | Age: 51
End: 2021-06-02

## 2021-06-02 NOTE — TELEPHONE ENCOUNTER
Refill Approved    Rx renewed per Medication Renewal Policy. Medication was last renewed on 8/19/19.    Marilyn Thomas, [facility] Triage/Med Refill 10/18/2019     Requested Prescriptions   Pending Prescriptions Disp Refills     sertraline (ZOLOFT) 100 MG tablet [Pharmacy Med Name: SERTRALINE HCL 100MG TABLET] 135 tablet 0     Sig: TAKE 1 AND 1/2 TABLETS BY  MOUTH DAILY       [facility] Refill Protocol  Passed - 10/17/2019  9:05 PM        Passed - PCP or prescribing provider visit in last year     Last office visit with prescriber/PCP: 9/9/2019 Irma Lerner MD OR same dept: 9/9/2019 Irma Lerner MD OR same specialty: 9/9/2019 Irma Lerner MD  Last physical: 4/26/2018 Last MTM visit: Visit date not found   Next visit within 3 mo: Visit date not found  Next physical within 3 mo: Visit date not found  Prescriber OR PCP: Irma Lerner MD  Last diagnosis associated with med order: 1. Anxiety  - sertraline (ZOLOFT) 100 MG tablet [Pharmacy Med Name: SERTRALINE HCL 100MG TABLET]; TAKE 1 AND 1/2 TABLETS BY  MOUTH DAILY  Dispense: 135 tablet; Refill: 0    If protocol passes may refill for 12 months if within 3 months of last provider visit (or a total of 15 months).

## 2021-06-02 NOTE — TELEPHONE ENCOUNTER
Refill Approved    Rx renewed per Medication Renewal Policy. Medication was last renewed on 9/11/19.    Traci Murphy, [facility] Triage/Med Refill 10/29/2019     Requested Prescriptions   Pending Prescriptions Disp Refills     SYNTHROID 112 mcg tablet [Pharmacy Med Name: SYNTHROID  0.112MG  TAB] 90 tablet      Sig: TAKE 1 TABLET BY MOUTH  DAILY       Thyroid Hormones Protocol Passed - 10/29/2019  8:56 PM        Passed - Provider visit in past 12 months or next 3 months     Last office visit with prescriber/PCP: 9/9/2019 Irma Lerner MD OR same dept: 9/9/2019 Irma Lerner MD OR same specialty: 9/9/2019 Irma Lerner MD  Last physical: 4/26/2018 Last MTM visit: Visit date not found   Next visit within 3 mo: Visit date not found  Next physical within 3 mo: Visit date not found  Prescriber OR PCP: Irma Lerner MD  Last diagnosis associated with med order: There are no diagnoses linked to this encounter.  If protocol passes may refill for 12 months if within 3 months of last provider visit (or a total of 15 months).             Passed - TSH on file in past 12 months for patient age 12 & older     TSH   Date Value Ref Range Status   09/09/2019 2.60 0.30 - 5.00 uIU/mL Final

## 2021-06-03 VITALS
DIASTOLIC BLOOD PRESSURE: 76 MMHG | SYSTOLIC BLOOD PRESSURE: 122 MMHG | HEIGHT: 63 IN | HEART RATE: 75 BPM | BODY MASS INDEX: 23.42 KG/M2 | WEIGHT: 132.19 LBS | OXYGEN SATURATION: 97 % | TEMPERATURE: 98.4 F

## 2021-06-04 VITALS
HEART RATE: 67 BPM | TEMPERATURE: 98.1 F | DIASTOLIC BLOOD PRESSURE: 64 MMHG | WEIGHT: 128.5 LBS | BODY MASS INDEX: 23.13 KG/M2 | OXYGEN SATURATION: 98 % | SYSTOLIC BLOOD PRESSURE: 127 MMHG

## 2021-06-05 VITALS
DIASTOLIC BLOOD PRESSURE: 88 MMHG | BODY MASS INDEX: 24.22 KG/M2 | HEIGHT: 62 IN | WEIGHT: 131.6 LBS | HEART RATE: 75 BPM | SYSTOLIC BLOOD PRESSURE: 136 MMHG | OXYGEN SATURATION: 97 %

## 2021-06-05 NOTE — PATIENT INSTRUCTIONS - HE
Dr. Analy Murrell  Functional Medicine  [facility]  [place]    Monitor symptoms on scale of 1-10  1. Back pain  2. IBS  3. Focus/concentration  4. Energy  5. Depression/Anxiety

## 2021-06-05 NOTE — PROGRESS NOTES
Assessment/Plan:     1. High risk medication use  Hepatic Profile   2. Irritable bowel syndrome, unspecified type     3. Anxiety     4. Chronic fatigue  Vitamin D, Total (25-Hydroxy)   5. Acquired hypothyroidism  Thyroid Stimulating Hormone (TSH)    T4, Free   6. Back pain, unspecified back location, unspecified back pain laterality, unspecified chronicity         Diagnoses and all orders for this visit:    High risk medication use  -     Hepatic Profile    Irritable bowel syndrome, unspecified type    Anxiety    Chronic fatigue  -     Vitamin D, Total (25-Hydroxy)    Acquired hypothyroidism  -     Thyroid Stimulating Hormone (TSH)  -     T4, Free    Back pain, unspecified back location, unspecified back pain laterality, unspecified chronicity         We discussed today that low-dose naltrexone therapy is something that I have not had previous experience with.  I did discuss with our pharmacist as well as reviewed available information regarding low-dose naltrexone therapy.  Discussed that there are clinicians in the area who do have previous experience with prescribing this and she is welcome to see 1 of those clinicians if she desires.  Otherwise I would be willing to work with her on this and give it a try.  Provided her name with a functional medicine specialist with the [facility].  In the meantime we will check liver enzymes today.  If liver enzymes are normal, will plan to start naltrexone 1.125 mg daily for 5 to 7 days and then increase to 2 0.25 mg daily for 5 to 7 days and then increase to 4.5 mg daily.  I encouraged her to develop an objective scale 1-10 for her symptoms of fatigue, IBS, anxiety and depression, concentration, and back pain.  Would recommend follow-up visit in approximately 1 month to review her response to treatment and recheck liver enzymes.  Discussed that possible side effects could include insomnia, vivid dreams and headaches but that side effects should improve in a  "few weeks.  She is to notify me if side effects are severe and she would like to stop therapy before her follow-up visit.  We discussed that it generally takes about 4 to 5 weeks before a response may be noticed and sometimes it can be several months before any improvement is seen.  She is comfortable with this plan and is willing to give it a try.  We will check above labs today.  We will also check thyroid labs again prior to starting treatment and we will follow-up with her again in 4 to 5 weeks.    The following are part of a depression follow up plan for the patient:  mental health care management    Subjective:      Sil Mancuso is a 49 y.o. female who comes in today to discuss starting low-dose naltrexone therapy.  She has a friend who is prescribed this for treatment of an autoimmune disorder.  Her friend started doing some research on it and suggested that she consider taking it because it may potentially help her with a lot of her medical issues.  Patient has history of anxiety and depression, ADD, chronic fatigue, irritable bowel syndrome as well as back pain.  Patient started doing research on her own and became interested in giving it a try.  She is aware that it is not FDA approved and it is not covered by insurance.  She feels that overall it would be a low risk therapy to take and she is willing to pay out-of-pocket for the cost of the medication.  She is aware that prescription of this would be off label.  She would like to discuss whether it would be something that I could prescribe for her.  She is currently on sertraline for anxiety and depression.  She is on Synthroid for hypothyroidism.  She is on birth control pills and she also uses dicyclomine for IBS symptoms.  Despite these medications she still does not feel \"normal\".  Is hopeful that a trial of low-dose naltrexone therapy may help improve her energy, concentration, IBS and mood symptoms.  She is aware that this medication would need " to be compounded.  She did have labs done to check on her thyroid in November and last had liver enzymes checked in September of last year.  We reviewed and updated her medications and allergies.  Review of systems is assessed and is otherwise negative.  No other concerns or questions today.      Current Outpatient Medications   Medication Sig Dispense Refill     dicyclomine (BENTYL) 10 MG capsule Take 1 capsule by mouth every 6 hours for up to 14 days 56 capsule 3     etonogestrel-ethinyl estradiol (NUVARING) 0.12-0.015 mg/24 hr vaginal ring INSERT 1 RING VAGINALLY  EVERY 28 DAYS; LEAVE RING  IN PLACE FOR 3 CONSECUTIVE  WEEKS, THEN REMOVE FOR 1  WEEK. 3 each 3     Lactobacillus rhamnosus GG (CULTURELLE) 10-15 Billion cell capsule Take 1 capsule by mouth daily.       sertraline (ZOLOFT) 100 MG tablet TAKE 1 AND 1/2 TABLETS BY  MOUTH DAILY 135 tablet 3     SYNTHROID 125 mcg tablet Take 1 tablet (125 mcg total) by mouth daily. 90 tablet 3     No current facility-administered medications for this visit.        Past Medical History, Family History, and Social History reviewed.  Past Medical History:   Diagnosis Date     Anxiety      Depression      Disease of thyroid gland      Hypertension 9/2017    Stage 1     Past Surgical History:   Procedure Laterality Date     LUMBAR LAMINECTOMY       SPINE SURGERY       Patient has no known allergies.  Family History   Problem Relation Age of Onset     Mental illness Son         ADHD     Cancer Mother         lung     Alcohol abuse Father      Mental illness Father         suicide     Depression Father      Early death Father         Suicide     Alcohol abuse Sister      Mental illness Sister      Alcohol abuse Sister      Mental illness Sister      Arthritis Maternal Grandfather      Depression Sister      Depression Sister      Diabetes Paternal Grandmother      Heart disease Paternal Grandfather      Mental illness Son      Social History     Socioeconomic History     Marital  status:      Spouse name: Not on file     Number of children: Not on file     Years of education: Not on file     Highest education level: Not on file   Occupational History     Not on file   Social Needs     Financial resource strain: Not on file     Food insecurity:     Worry: Not on file     Inability: Not on file     Transportation needs:     Medical: Not on file     Non-medical: Not on file   Tobacco Use     Smoking status: Never Smoker     Smokeless tobacco: Never Used   Substance and Sexual Activity     Alcohol use: Yes     Alcohol/week: 4.0 standard drinks     Types: 4 Cans of beer per week     Comment: occ     Drug use: No     Sexual activity: Yes     Partners: Male     Birth control/protection: Inserts   Lifestyle     Physical activity:     Days per week: Not on file     Minutes per session: Not on file     Stress: Not on file   Relationships     Social connections:     Talks on phone: Not on file     Gets together: Not on file     Attends [employer] service: Not on file     Active member of club or organization: Not on file     Attends meetings of clubs or organizations: Not on file     Relationship status: Not on file     Intimate partner violence:     Fear of current or ex partner: Not on file     Emotionally abused: Not on file     Physically abused: Not on file     Forced sexual activity: Not on file   Other Topics Concern     Not on file   Social History Narrative     Not on file         Review of systems is as stated in HPI, and the remainder of the 10 system review is otherwise negative.    Objective:     Vitals:    01/30/20 1131   BP: 127/64   Patient Site: Right Arm   Patient Position: Sitting   Cuff Size: Adult Regular   Pulse: 67   Temp: 98.1  F (36.7  C)   TempSrc: Oral   SpO2: 98%   Weight: 128 lb 8 oz (58.3 kg)    Body mass index is 23.13 kg/m .    General appearance: alert, appears stated age and cooperative  Head: Normocephalic, without obvious abnormality, atraumatic  Neurologic:  Grossly normal  Psych: mood appropriate, affect normal    PHQ-9: 12  GIOVANNI-7: 10      This note has been dictated using voice recognition software. Any grammatical or context distortions are unintentional and inherent to the the software.

## 2021-06-06 NOTE — PATIENT INSTRUCTIONS - HE
You are being tested for Corona Virus 19, Influenza and possibly RSV.    We will call you with your results.    Isolate Yourself:    Isolate yourself while traveling.    Do Not allow any visitors within 6 feet.    Do Not go to work or school.    Do Not go to [employer],  centers, shopping, or other public places.    Do Not shake hands.    Avoid close contact with others (hugging, kissing).    Protect Others:    Cover Your Mouth and Nose with a mask, disposable tissue or wash cloth to avoid spreading germs to others.    Wash your hands and face frequently with soap and water    Call Back If: Breathing difficulty develops or you become worse.    For more information about COVID19 and options for caring for yourself at home, please visit the CDC website at https://www.cdc.gov/coronavirus/2019-ncov/about/steps-when-sick.html  For more options for care at [facility], please visit our website at [email or web address]

## 2021-06-07 NOTE — TELEPHONE ENCOUNTER
"Test Results  Who is calling?:  The patient   Who ordered the test:  Luis Alfredo Rasmussen PA-C  Type of test: Lab  Date of test:  3/15/20  Where was the test performed:  [facility]  What are your questions/concerns?:  The patient states she received a message in My chart \"see Scanned report\", there was no report of the lab results attached.  The patient was informed per this writer of the negative Covid-19 result.   Okay to leave a detailed message?:  No   The patient does not have any further concerns.    "

## 2021-06-11 NOTE — TELEPHONE ENCOUNTER
Refill Approved    Rx renewed per Medication Renewal Policy. Medication was last renewed on 9/9/19, last OV 1/30/20.    Emily Ga, [facility] Triage/Med Refill 9/6/2020     Requested Prescriptions   Pending Prescriptions Disp Refills     etonogestreL-ethinyl estradioL (NUVARING) 0.12-0.015 mg/24 hr vaginal ring [Pharmacy Med Name: NUVARING RING  VAGINAL RING] 1 each 3     Sig: INSERT 1 RING VAGINALLY  EVERY 28 DAYS. LEAVE RING  IN PLACE FOR 3 CONSECUTIVE  WEEKS THEN REMOVE FOR 1  WEEK.       Oral Contraceptives Protocol Passed - 9/2/2020  9:43 PM        Passed - Visit with PCP or prescribing provider visit in last 12 months      Last office visit with prescriber/PCP: 1/30/2020 Irma Lerner MD OR same dept: 1/30/2020 Irma Lerner MD OR same specialty: 1/30/2020 Irma Lerner MD  Last physical: 4/26/2018 Last MTM visit: Visit date not found   Next visit within 3 mo: Visit date not found  Next physical within 3 mo: Visit date not found  Prescriber OR PCP: Irma Lerner MD  Last diagnosis associated with med order: 1. Uses birth control  - NUVARING 0.12-0.015 mg/24 hr vaginal ring [Pharmacy Med Name: NUVARING RING  VAGINAL RING]; INSERT 1 RING VAGINALLY  EVERY 28 DAYS. LEAVE RING  IN PLACE FOR 3 CONSECUTIVE  WEEKS THEN REMOVE FOR 1  WEEK.  Dispense: 1 each; Refill: 3    If protocol passes may refill for 12 months if within 3 months of last provider visit (or a total of 15 months).

## 2021-06-11 NOTE — TELEPHONE ENCOUNTER
Refill Approved    Rx renewed per Medication Renewal Policy. Medication was last renewed on 10/18/19.    Marilyn Thomas, [facility] Connection Triage/Med Refill 9/23/2020     Requested Prescriptions   Pending Prescriptions Disp Refills     sertraline (ZOLOFT) 100 MG tablet [Pharmacy Med Name: SERTRALINE HCL 100MG TABLET] 135 tablet 3     Sig: TAKE 1 AND 1/2 TABLETS BY  MOUTH DAILY       [facility] Refill Protocol  Passed - 9/21/2020  9:41 PM        Passed - PCP or prescribing provider visit in last year     Last office visit with prescriber/PCP: 1/30/2020 Irma Lerner MD OR same dept: 1/30/2020 Irma Lerner MD OR same specialty: 1/30/2020 Irma Lerner MD  Last physical: 4/26/2018 Last MTM visit: Visit date not found   Next visit within 3 mo: Visit date not found  Next physical within 3 mo: Visit date not found  Prescriber OR PCP: Irma Lerner MD  Last diagnosis associated with med order: 1. Anxiety  - sertraline (ZOLOFT) 100 MG tablet [Pharmacy Med Name: SERTRALINE HCL 100MG TABLET]; TAKE 1 AND 1/2 TABLETS BY  MOUTH DAILY  Dispense: 135 tablet; Refill: 3    If protocol passes may refill for 12 months if within 3 months of last provider visit (or a total of 15 months).

## 2021-06-12 NOTE — TELEPHONE ENCOUNTER
Refill Approved    Rx renewed per Medication Renewal Policy. Medication was last renewed on 9/6/20.    Marilyn Thomas, [facility] Connection Triage/Med Refill 11/4/2020     Requested Prescriptions   Pending Prescriptions Disp Refills     etonogestreL-ethinyl estradioL (NUVARING) 0.12-0.015 mg/24 hr vaginal ring [Pharmacy Med Name: NUVARING RING  VAGINAL RING] 1 each 3     Sig: INSERT 1 RING VAGINALLY  EVERY 4 WEEKS AND LEAVE IN  PLACE FOR 3 WEEKS, REMOVE  FOR 1 WEEK, THEN INSERT NEW RING       Oral Contraceptives Protocol Passed - 11/1/2020  9:18 PM        Passed - Visit with PCP or prescribing provider visit in last 12 months      Last office visit with prescriber/PCP: 1/30/2020 Irma Lerner MD OR same dept: 1/30/2020 Irma Lerner MD OR same specialty: 1/30/2020 Irma Lerner MD  Last physical: 10/30/2020 Last MTM visit: Visit date not found   Next visit within 3 mo: Visit date not found  Next physical within 3 mo: Visit date not found  Prescriber OR PCP: Irma Lerner MD  Last diagnosis associated with med order: 1. Uses birth control  - NUVARING 0.12-0.015 mg/24 hr vaginal ring [Pharmacy Med Name: NUVARING RING  VAGINAL RING]; INSERT 1 RING VAGINALLY  EVERY 4 WEEKS AND LEAVE IN  PLACE FOR 3 WEEKS, REMOVE  FOR 1 WEEK, THEN INSERT NEW RING  Dispense: 1 each; Refill: 3    If protocol passes may refill for 12 months if within 3 months of last provider visit (or a total of 15 months).

## 2021-06-12 NOTE — PROGRESS NOTES
Assessment:        1. Routine general medical examination at a health care facility  Lipid Cascade RANDOM    Glucose   2. Acquired hypothyroidism  Thyroid Stimulating Hormone (TSH)    T4, Free   3. Visit for screening mammogram  Mammo Screening Bilateral   4. Anxiety     5. Irritable bowel syndrome, unspecified type     6. Mild recurrent major depression (H)         Plan:      1. Healthcare maintenance: Influenza vaccine administered.  We will check lipids and glucose today.  Order placed for mammogram.  Will be due for Pap smear in 2021.  Encouraged regular exercise and healthy diet.  Discussed importance of adequate calcium intake.  She takes a vitamin D supplement.  Continue with regular vision and dental exams.  Recommend follow-up in 1 year for annual exam  2. Hypothyroidism: Check TSH and free T4.  Continue levothyroxine.  Will adjust dose if needed based on lab results.  She will need refill of levothyroxine once results are available  3. Anxiety/mild recurrent major depression: PHQ-9 score today is 5.  Doing well on current dose of sertraline.  4. IBS: Symptoms well controlled.  Has dicyclomine to use as needed  5. Contraception management: Doing well with NuvaRing.          Subjective:      Sil Mancuso is a 50 y.o. female who presents for an annual exam.  Reviewed her health history.  She has underlying hypothyroidism and continues on levothyroxine.  She has history of anxiety and depression and is doing well on current dose of sertraline 150 mg daily.  She has history of IBS.  She was taking low-dose naltrexone for this several months ago.  Was not noticing much improvement in how she was feeling with the low-dose naltrexone and then COVID-19 pandemic began and she did not bother to get a refill.  She started seeing a chiropractor that was recommended to her by a friend and she reports that she has been feeling much better.  She has been making some dietary changes and her IBS symptoms have  significantly improved.  She does have prescription for dicyclomine that she uses very rarely.  She has been working from home and that has been going very well for her.  Her family is doing well.  She reports no changes in family history.  She has been in to see the dentist.  She is exercising regularly.  She does need a flu vaccine.  She is due for mammogram.  Pap smear is up-to-date.  Review of systems is assessed and is otherwise negative.  No other concerns or questions today.    Healthy Habits:   Regular Exercise: Yes  Sunscreen Use: Yes  Healthy Diet: Yes  Dental Visits Regularly: Yes  Seat Belt: Yes  Sexually active: Yes  Self Breast Exam Monthly:Yes  Lipid Profile: Yes  Glucose Screen: Yes  Prevention of Osteoporosis: Yes  Last Dexa: N/A        Immunization History   Administered Date(s) Administered     Influenza, inj, historic,unspecified 10/16/2015     Influenza,seasonal,quad inj =/> 6months 2016, 2019     Td,adult,historic,unspecified 1972     Tdap 04/15/2015     Immunization status: due today.      Gynecologic History  No LMP recorded. (Menstrual status: Contraception).  Contraception: NuvaRing vaginal inserts  Last Pap: 2018. Results were: normal  Last mammogram: 2019. Results were: normal      OB History    Para Term  AB Living   2 2 2         SAB TAB Ectopic Multiple Live Births                  # Outcome Date GA Lbr Dwayne/2nd Weight Sex Delivery Anes PTL Lv   2 Term            1 Term                Current Outpatient Medications   Medication Sig Dispense Refill     cholecalciferol, vitamin D3, 1,000 unit (25 mcg) tablet Take 1,000 Units by mouth daily.       dicyclomine (BENTYL) 10 MG capsule Take 1 capsule by mouth every 6 hours for up to 14 days 56 capsule 3     etonogestreL-ethinyl estradioL (NUVARING) 0.12-0.015 mg/24 hr vaginal ring INSERT 1 RING VAGINALLY  EVERY 28 DAYS. LEAVE RING  IN PLACE FOR 3 CONSECUTIVE  WEEKS THEN REMOVE FOR 1  WEEK. 1 each 3      Lactobacillus rhamnosus GG (CULTURELLE) 10-15 Billion cell capsule Take 1 capsule by mouth daily.       levothyroxine (SYNTHROID, LEVOTHROID) 137 MCG tablet Take 1 tablet (137 mcg total) by mouth daily. 90 tablet 0     sertraline (ZOLOFT) 100 MG tablet TAKE 1 AND 1/2 TABLETS BY  MOUTH DAILY 135 tablet 1     No current facility-administered medications for this visit.      Past Medical History:   Diagnosis Date     Anxiety      Depression      Disease of thyroid gland      Hypertension 9/2017    Stage 1     Past Surgical History:   Procedure Laterality Date     LUMBAR LAMINECTOMY       SPINE SURGERY       Patient has no known allergies.  Family History   Problem Relation Age of Onset     Mental illness Son         ADHD     Cancer Mother         lung     Alcohol abuse Father      Mental illness Father         suicide     Depression Father      Early death Father         Suicide     Alcohol abuse Sister      Mental illness Sister      Alcohol abuse Sister      Mental illness Sister      Arthritis Maternal Grandfather      Depression Sister      Depression Sister      Diabetes Paternal Grandmother      Heart disease Paternal Grandfather      Mental illness Son      Social History     Socioeconomic History     Marital status:      Spouse name: Not on file     Number of children: Not on file     Years of education: Not on file     Highest education level: Not on file   Occupational History     Not on file   Social Needs     Financial resource strain: Not on file     Food insecurity     Worry: Not on file     Inability: Not on file     Transportation needs     Medical: Not on file     Non-medical: Not on file   Tobacco Use     Smoking status: Never Smoker     Smokeless tobacco: Never Used   Substance and Sexual Activity     Alcohol use: Yes     Alcohol/week: 4.0 standard drinks     Types: 4 Cans of beer per week     Comment: occ     Drug use: No     Sexual activity: Yes     Partners: Male     Birth  "control/protection: Inserts   Lifestyle     Physical activity     Days per week: Not on file     Minutes per session: Not on file     Stress: Not on file   Relationships     Social connections     Talks on phone: Not on file     Gets together: Not on file     Attends [employer] service: Not on file     Active member of club or organization: Not on file     Attends meetings of clubs or organizations: Not on file     Relationship status: Not on file     Intimate partner violence     Fear of current or ex partner: Not on file     Emotionally abused: Not on file     Physically abused: Not on file     Forced sexual activity: Not on file   Other Topics Concern     Not on file   Social History Narrative     Not on file       Review of Systems  General:  Denies problem  Eyes: Denies problem  Ears/Nose/Throat: Denies problem  Cardiovascular: Denies problem  Respiratory:  Denies problem  Gastrointestinal:  Denies problem, Genitourinary: Denies problem  Musculoskeletal:  Denies problem  Skin: Denies problem  Neurologic: Denies problem  Psychiatric: Denies problem  Endocrine: Denies problem  Heme/Lymphatic: Denies problem   Allergic/Immunologic: Denies problem        Objective:         /88 (Patient Site: Right Arm, Patient Position: Sitting, Cuff Size: Adult Regular)   Pulse 75   Ht 5' 2.01\" (1.575 m)   Wt 131 lb 9.6 oz (59.7 kg)   SpO2 97%   Breastfeeding No   BMI 24.06 kg/m        Physical Exam:  General Appearance: Alert, cooperative, no distress, appears stated age  Head: Normocephalic, without obvious abnormality, atraumatic  Eyes: PERRL, conjunctiva/corneas clear, EOM's intact  Ears: Normal TM's and external ear canals, both ears  Neck: Supple, symmetrical, trachea midline,  Back: Symmetric, no curvature, ROM normal, no CVA tenderness  Lungs: Clear to auscultation bilaterally, respirations unlabored  Breasts: No breast masses, tenderness, asymmetry, or nipple discharge.  Heart: Regular rate and rhythm, S1 and " S2 normal, no murmur, rub, or gallop, Abdomen: Soft, non-tender, bowel sounds active all four quadrants,  no masses, no organomegaly  Pelvic:Not examined  Extremities: Extremities normal, atraumatic, no cyanosis or edema  Skin: Skin color, texture, turgor normal, no rashes or lesions  Neurologic: Normal

## 2021-06-13 NOTE — PROGRESS NOTES
"Sil Mancuso is a 50 y.o. female who is being evaluated via a billable telephone visit.      The patient has been notified of following:     \"This telephone visit will be conducted via a call between you and your physician/provider. We have found that certain health care needs can be provided without the need for a physical exam.  This service lets us provide the care you need with a short phone conversation.  If a prescription is necessary we can send it directly to your pharmacy.  If lab work is needed we can place an order for that and you can then stop by our lab to have the test done at a later time.    Telephone visits are billed at different rates depending on your insurance coverage. During this emergency period, for some insurers they may be billed the same as an in-person visit.  Please reach out to your insurance provider with any questions.    If during the course of the call the physician/provider feels a telephone visit is not appropriate, you will not be charged for this service.\"    Patient has given verbal consent to a Telephone visit? Yes    What phone number would you like to be contacted at? [phone]    Patient would like to receive their AVS by AVS Preference: Emily.        Assessment/Plan:     1. Primary insomnia         Diagnoses and all orders for this visit:    Primary insomnia       We discussed prescription medications that can be used for treatment of insomnia.  Discussed hydroxyzine, trazodone, zolpidem as well as lorazepam or clonazepam.  She prefers something that may help with her anxiety and does not have a risk for dependence.  Hydroxyzine was felt to be a good initial choice and prescription for this is sent to pharmacy.  She was counseled on use of this medication and side effects.  She will let me know if this medication is not effective and we will look at one of the alternative options as the next step.        Subjective:      Sil Mancuso is a 50 y.o. female who " is evaluated today via telephone encounter to discuss insomnia.  Patient developed symptoms of COVID-19 on November 23, 2020 and tested positive on November 25, 2020.  She states that she had significant body aches as well as fatigue and some congestion but no cough or shortness of breath.  She is feeling much better.  Still has not fully recovered her sense of taste and smell.  She states that during the second week of her illness she started having some difficulty falling asleep at night.  She would get ready for bed and feel very tired but then she would have difficulty getting to sleep.  She initially thought that was because she was sleeping a lot during the day and taking naps.  However this has continued.  She states it typically takes between 1 to 4 hours for her to fall asleep.  She is now starting to feel tired the next day.  This is causing her to have increased anxiety.  She does have a history of sleep troubles.  In the past she would not have any difficulty falling asleep but if she were to get up in the middle of the night to use the bathroom she would have trouble getting back to sleep.  She has used over-the-counter sleep aids to help with this.  She recalls being prescribed medication for sleep very briefly many years ago but does not recall what medication this was.  She is interested in prescription medication to help fall asleep at this time.  She has no additional concerns or questions.  Review of systems is otherwise negative.    Current Outpatient Medications   Medication Sig Dispense Refill     cholecalciferol, vitamin D3, 1,000 unit (25 mcg) tablet Take 1,000 Units by mouth daily.       etonogestreL-ethinyl estradioL (NUVARING) 0.12-0.015 mg/24 hr vaginal ring INSERT 1 RING VAGINALLY  EVERY 4 WEEKS AND LEAVE IN  PLACE FOR 3 WEEKS, REMOVE  FOR 1 WEEK, THEN INSERT NEW RING 3 each 3     Lactobacillus rhamnosus GG (CULTURELLE) 10-15 Billion cell capsule Take 1 capsule by mouth daily.        levothyroxine (SYNTHROID) 150 MCG tablet Take 1 tablet (150 mcg total) by mouth daily. 90 tablet 3     sertraline (ZOLOFT) 100 MG tablet TAKE 1 AND 1/2 TABLETS BY  MOUTH DAILY 135 tablet 1     dicyclomine (BENTYL) 10 MG capsule Take 1 capsule by mouth every 6 hours for up to 14 days 56 capsule 3     No current facility-administered medications for this visit.        Past Medical History, Family History, and Social History reviewed.  Past Medical History:   Diagnosis Date     Anxiety      Depression      Disease of thyroid gland      Hypertension 9/2017    Stage 1     Past Surgical History:   Procedure Laterality Date     LUMBAR LAMINECTOMY       SPINE SURGERY       Patient has no known allergies.  Family History   Problem Relation Age of Onset     Mental illness Son         ADHD     Cancer Mother         lung     Alcohol abuse Father      Mental illness Father         suicide     Depression Father      Early death Father         Suicide     Alcohol abuse Sister      Mental illness Sister      Alcohol abuse Sister      Mental illness Sister      Arthritis Maternal Grandfather      Depression Sister      Depression Sister      Diabetes Paternal Grandmother      Heart disease Paternal Grandfather      Mental illness Son      Social History     Socioeconomic History     Marital status:      Spouse name: Not on file     Number of children: Not on file     Years of education: Not on file     Highest education level: Not on file   Occupational History     Not on file   Social Needs     Financial resource strain: Not on file     Food insecurity     Worry: Not on file     Inability: Not on file     Transportation needs     Medical: Not on file     Non-medical: Not on file   Tobacco Use     Smoking status: Never Smoker     Smokeless tobacco: Never Used   Substance and Sexual Activity     Alcohol use: Yes     Alcohol/week: 4.0 standard drinks     Types: 4 Cans of beer per week     Comment: occ     Drug use: No      Sexual activity: Yes     Partners: Male     Birth control/protection: Inserts   Lifestyle     Physical activity     Days per week: Not on file     Minutes per session: Not on file     Stress: Not on file   Relationships     Social connections     Talks on phone: Not on file     Gets together: Not on file     Attends Zoroastrianism service: Not on file     Active member of club or organization: Not on file     Attends meetings of clubs or organizations: Not on file     Relationship status: Not on file     Intimate partner violence     Fear of current or ex partner: Not on file     Emotionally abused: Not on file     Physically abused: Not on file     Forced sexual activity: Not on file   Other Topics Concern     Not on file   Social History Narrative     Not on file         Review of systems is as stated in HPI, and the remainder of the 10 system review is otherwise negative.    Objective:     There were no vitals filed for this visit. There is no height or weight on file to calculate BMI.    Exam: She is alert.  She is speaking clearly.  She does not sound short of breath    This note has been dictated using voice recognition software. Any grammatical or context distortions are unintentional and inherent to the the software.             Phone call duration:  12 minutes    Irma Lerner MD

## 2021-06-15 NOTE — TELEPHONE ENCOUNTER
Refill Approved    Rx renewed per Medication Renewal Policy. Medication was last renewed on 9/23/20.    Roger Oneal, [facility] Triage/Med Refill 3/3/2021     Requested Prescriptions   Pending Prescriptions Disp Refills     sertraline (ZOLOFT) 100 MG tablet [Pharmacy Med Name: SERTRALINE HCL 100MG TABLET] 135 tablet 3     Sig: TAKE 1 AND 1/2 TABLETS BY  MOUTH DAILY       [facility] Refill Protocol  Passed - 3/2/2021  9:14 PM        Passed - PCP or prescribing provider visit in last year     Last office visit with prescriber/PCP: 1/30/2020 Irma Lerner MD OR same dept: Visit date not found OR same specialty: 1/30/2020 Irma Lerner MD  Last physical: 10/30/2020 Last MTM visit: Visit date not found   Next visit within 3 mo: Visit date not found  Next physical within 3 mo: Visit date not found  Prescriber OR PCP: Irma Lerner MD  Last diagnosis associated with med order: 1. Anxiety  - sertraline (ZOLOFT) 100 MG tablet [Pharmacy Med Name: SERTRALINE HCL 100MG TABLET]; TAKE 1 AND 1/2 TABLETS BY  MOUTH DAILY  Dispense: 135 tablet; Refill: 3    If protocol passes may refill for 12 months if within 3 months of last provider visit (or a total of 15 months).

## 2021-06-17 NOTE — PROGRESS NOTES
Assessment:        1. Routine general medical examination at a health care facility  Lipid Cascade FASTING    Glucose    Hemoglobin    Gynecologic Cytology (PAP Smear)   2. Visit for screening mammogram  Mammo Screening Bilateral   3. Hypothyroidism, unspecified type  Thyroid Stimulating Hormone (TSH)    T4, Free   4. Moderate recurrent major depression     5. Anxiety     6. History of vitamin D deficiency  Vitamin D, Total (25-Hydroxy)       Plan:      1. Healthcare maintenance: Pap smear performed.  Check fasting lipids, glucose and hemoglobin.  Order placed for mammogram.  Vaccines up-to-date.  Encouraged regular exercise, healthy diet and adequate calcium and vitamin D intake.  2. Hypothyroidism: Check TSH and free T4.  Continue Synthroid.  3. Moderate recurrent major depression and anxiety: We will continue fluoxetine 60 mg daily.  Gene site testing performed.  Consider alternative therapy once report is received.  Encouraged use of sun lamp.  Check thyroid and vitamin D levels.  Continue with regular exercise and healthy diet.  4. History of vitamin D deficiency: Check vitamin D level.  Not currently taking a vitamin D supplement.          Subjective:      Sil Thao is a 47 y.o. female who presents for an annual exam.  Reviewed her health history.  No significant changes in health since she was last seen in 2016.  She is due for mammogram.  She has hypothyroidism.  Continues on Synthroid.  She has history of anxiety, depression and irritable bowel syndrome.  She continues on fluoxetine 60 mg daily.  Continues to experience symptoms of depression.  Describes lack of interest and desire and things.  Describes lack of motivation.  Looks forward to going to bed each night.  Has been working hard on getting regular exercise and good nutrition since the beginning of the year.  Does feel better but still feels that something is lacking as far as her mood symptoms.  We had previously discussed trying a sun  lamp.  However she has not yet given this a try.  Counseling has not worked well for her in the past.  She cannot identify anything in particular that is causing her to feel this way.  She is  and her relationship is good.  Her children are healthy and doing well.  She does say that she does not love her job and is looking for a new position but work is going well for the most part.  She uses NuvaRing for contraception.  This works well for her.  No side effects of concern with any of her medications.  We reviewed medications, allergies, family and social history and updated the chart.  Her back and neck have been feeling good with her regular exercise.  She does have biometric screening form that she would like completed today.  She is fasting.  No other concerns or questions.  Review of systems is assessed and is otherwise negative.    Healthy Habits:   Regular Exercise: Yes  Sunscreen Use: Yes  Healthy Diet: Yes  Dental Visits Regularly: Yes  Seat Belt: Yes  Sexually active: Yes  Self Breast Exam Monthly:Yes  Hemoccults: N/A  Flex Sig: N/A  Colonoscopy: N/A  Lipid Profile: Yes  Glucose Screen: Yes  Prevention of Osteoporosis: Yes  Last Dexa: N/A  Guns at Home:  N/A      Immunization History   Administered Date(s) Administered     Influenza, inj, historic,unspecified 10/16/2015     Influenza, seasonal,quad inj 36+ mos 2016     Td,adult,historic,unspecified 1972     Tdap 04/15/2015     Immunization status: up to date and documented.      Gynecologic History  No LMP recorded. Patient is not currently having periods (Reason: Contraception).  Contraception: NuvaRing vaginal inserts  Last Pap: . Results were: normal  Last mammogram: . Results were: normal      OB History    Para Term  AB Living   2 2 2      SAB TAB Ectopic Multiple Live Births             # Outcome Date GA Lbr Dwayne/2nd Weight Sex Delivery Anes PTL Lv   2 Term            1 Term                   Current Outpatient  Prescriptions   Medication Sig Dispense Refill     etonogestrel-ethinyl estradiol (NUVARING) 0.12-0.015 mg/24 hr vaginal ring Insert 1 each into the vagina every 28 days. Insert vaginally and leave in place for 3 consecutive weeks, then remove for 1 week. 3 each 4     FLUoxetine (PROZAC) 20 MG capsule Take 3 capsules (60 mg total) by mouth daily. 270 capsule 3     Lactobacillus rhamnosus GG (CULTURELLE) 10-15 Billion cell capsule Take 1 capsule by mouth daily.       naproxen (NAPROSYN) 500 MG tablet        SYNTHROID 112 mcg tablet TAKE 1 TABLET BY MOUTH  DAILY 90 tablet 1     No current facility-administered medications for this visit.      Past Medical History:   Diagnosis Date     Anxiety      Depression      Disease of thyroid gland      Hypertension 9/2017    Stage 1     Past Surgical History:   Procedure Laterality Date     LUMBAR LAMINECTOMY       SPINE SURGERY       Review of patient's allergies indicates no known allergies.  Family History   Problem Relation Age of Onset     Mental illness Son      ADHD     Cancer Mother      lung     Alcohol abuse Father      Mental illness Father      suicide     Depression Father      Early death Father      Suicide     Alcohol abuse Sister      Mental illness Sister      Alcohol abuse Sister      Mental illness Sister      Arthritis Maternal Grandfather      Depression Sister      Depression Sister      Diabetes Paternal Grandmother      Heart disease Paternal Grandfather      Mental illness Son      Social History     Social History     Marital status:      Spouse name: N/A     Number of children: N/A     Years of education: N/A     Occupational History     Not on file.     Social History Main Topics     Smoking status: Never Smoker     Smokeless tobacco: Never Used     Alcohol use 2.4 oz/week     4 Cans of beer per week      Comment: occ     Drug use: No     Sexual activity: Yes     Partners: Male     Birth control/ protection: Inserts     Other Topics Concern  "    Not on file     Social History Narrative       Review of Systems    See HPI      Objective:         /80 (Patient Site: Left Arm, Patient Position: Sitting, Cuff Size: Adult Regular)  Pulse 78  Temp 98.6  F (37  C) (Tympanic)   Ht 5' 2\" (1.575 m)  Wt 122 lb 4 oz (55.5 kg)  SpO2 100%  BMI 22.36 kg/m2      Physical Exam:  General Appearance: Alert, cooperative, no distress, appears stated age  Head: Normocephalic, without obvious abnormality, atraumatic  Eyes: PERRL, conjunctiva/corneas clear, EOM's intact  Ears: Normal TM's and external ear canals, both ears  Nose: Nares normal, septum midline,mucosa normal, no drainage  Throat: Lips, mucosa, and tongue normal; teeth and gums normal  Neck: Supple, symmetrical, trachea midline, no adenopathy;  thyroid: not enlarged, symmetric, no tenderness/mass/nodules;   Back: Symmetric, no curvature, ROM normal  Lungs: Clear to auscultation bilaterally, respirations unlabored  Breasts: No breast masses, tenderness, asymmetry, or nipple discharge.  Heart: Regular rate and rhythm, S1 and S2 normal, no murmur, rub, or gallop, Abdomen: Soft, non-tender, bowel sounds active all four quadrants,  no masses, no organomegaly  Pelvic:Normally developed genitalia with no external lesions or eruptions. Vagina and cervix show no lesions, inflammation, discharge or tenderness. No cystocele, No rectocele. Uterus normal.  No adnexal mass or tenderness.  Extremities: Extremities normal, atraumatic, no cyanosis or edema  Skin: Skin color, texture, turgor normal, no rashes or lesions  Lymph nodes: Cervical, supraclavicular, and axillary nodes normal  Neurologic: Normal      Results:  PHQ-9: 15  GIOVANNI-7: 10  "

## 2021-06-20 ENCOUNTER — HEALTH MAINTENANCE LETTER (OUTPATIENT)
Age: 51
End: 2021-06-20

## 2021-06-24 NOTE — TELEPHONE ENCOUNTER
Refill Approved    Rx renewed per Medication Renewal Policy. Medication was last renewed on 2/22/19.    Ov: 4/26/18    [place] [place], [facility] Triage/Med Refill 3/1/2019     Requested Prescriptions   Pending Prescriptions Disp Refills     SYNTHROID 112 mcg tablet 90 tablet 0     Sig: Take 1 tablet (112 mcg total) by mouth daily.    Thyroid Hormones Protocol Passed - 3/1/2019  7:57 AM       Passed - Provider visit in past 12 months or next 3 months    Last office visit with prescriber/PCP: 12/14/2016 Irma Lerner MD OR same dept: Visit date not found OR same specialty: 12/14/2016 Irma Lerner MD  Last physical: 4/26/2018 Last MTM visit: Visit date not found   Next visit within 3 mo: Visit date not found  Next physical within 3 mo: Visit date not found  Prescriber OR PCP: Irma Lerner MD  Last diagnosis associated with med order: 1. Hypothyroidism, unspecified type  - SYNTHROID 112 mcg tablet; Take 1 tablet (112 mcg total) by mouth daily.  Dispense: 90 tablet; Refill: 0    If protocol passes may refill for 12 months if within 3 months of last provider visit (or a total of 15 months).            Passed - TSH on file in past 12 months for patient age 12 & older    TSH   Date Value Ref Range Status   04/26/2018 1.48 0.30 - 5.00 uIU/mL Final

## 2021-06-24 NOTE — TELEPHONE ENCOUNTER
Request from [facility]. It appears the patient has switched to this mail order pharmacy. Please send a new prescription as they are unable to transfer the most recent prescription that was sent.        Refill Request  Did you contact pharmacy: Yes  Medication name:   Requested Prescriptions     Pending Prescriptions Disp Refills     SYNTHROID 112 mcg tablet 90 tablet 0     Sig: Take 1 tablet (112 mcg total) by mouth daily.     Who prescribed the medication: Dr Lerner  Pharmacy Name and Location: [facility]  Is patient out of medication: unknown  Patient notified refills processed in 72 hours:  no  Okay to leave a detailed message: no

## 2021-07-03 NOTE — ADDENDUM NOTE
Addendum Note by Irma Omalley MD at 2/25/2020  1:24 PM     Author: Irma Omalley MD Service: -- Author Type: Physician    Filed: 2/25/2020  1:24 PM Encounter Date: 2/7/2020 Status: Signed    : Irma Omalley MD (Physician)    Addended by: IRMA OMALLEY on: 2/25/2020 01:24 PM        Modules accepted: Orders

## 2021-07-03 NOTE — ADDENDUM NOTE
Addendum Note by Irma Omalley MD at 7/9/2018  7:34 AM     Author: Irma Omalley MD Service: -- Author Type: Physician    Filed: 7/9/2018  7:34 AM Encounter Date: 6/13/2018 Status: Signed    : Irma Omalley MD (Physician)    Addended by: IRMA OMALLEY on: 7/9/2018 07:34 AM        Modules accepted: Orders

## 2021-07-06 ENCOUNTER — COMMUNICATION - HEALTHEAST (OUTPATIENT)
Dept: FAMILY MEDICINE | Facility: CLINIC | Age: 51
End: 2021-07-06

## 2021-07-26 ENCOUNTER — MYC MEDICAL ADVICE (OUTPATIENT)
Dept: FAMILY MEDICINE | Facility: CLINIC | Age: 51
End: 2021-07-26

## 2021-07-26 DIAGNOSIS — F51.01 PRIMARY INSOMNIA: Primary | ICD-10-CM

## 2021-07-26 RX ORDER — TRAZODONE HYDROCHLORIDE 50 MG/1
50 TABLET, FILM COATED ORAL
Qty: 90 TABLET | Refills: 1 | Status: SHIPPED | OUTPATIENT
Start: 2021-07-26 | End: 2021-09-10

## 2021-09-01 DIAGNOSIS — Z78.9 USES BIRTH CONTROL: ICD-10-CM

## 2021-09-02 RX ORDER — ETONOGESTREL/ETHINYL ESTRADIOL .12-.015MG
RING, VAGINAL VAGINAL
Qty: 3 EACH | Refills: 0 | Status: SHIPPED | OUTPATIENT
Start: 2021-09-02 | End: 2021-09-10

## 2021-09-02 NOTE — TELEPHONE ENCOUNTER
"Last Written Prescription Date:  11/4/20  Last Fill Quantity: 3,  # refills: 3   Last office visit provider:  12/10/20     Requested Prescriptions   Pending Prescriptions Disp Refills     NUVARING 0.12-0.015 MG/24HR vaginal ring [Pharmacy Med Name: NUVARING RING  VAGINAL RING]  3     Sig: INSERT 1 RING VAGINALLY  EVERY 4 WEEKS AND LEAVE IN  PLACE FOR 3 WEEKS, REMOVE  FOR 1 WEEK, THEN INSERT NEW RING       Contraceptives Protocol Passed - 9/1/2021 10:07 PM        Passed - Patient is not a current smoker if age is 35 or older        Passed - Recent (12 mo) or future (30 days) visit within the authorizing provider's specialty     Patient has had an office visit with the authorizing provider or a provider within the authorizing providers department within the previous 12 mos or has a future within next 30 days. See \"Patient Info\" tab in inbasket, or \"Choose Columns\" in Meds & Orders section of the refill encounter.              Passed - Medication is active on med list        Passed - No active pregnancy on record        Passed - No positive pregnancy test in past 12 months             jude orozco RN 09/02/21 2:13 PM  "

## 2021-09-10 ENCOUNTER — VIRTUAL VISIT (OUTPATIENT)
Dept: FAMILY MEDICINE | Facility: CLINIC | Age: 51
End: 2021-09-10
Payer: COMMERCIAL

## 2021-09-10 DIAGNOSIS — F41.1 ANXIETY STATE: ICD-10-CM

## 2021-09-10 DIAGNOSIS — F51.01 PRIMARY INSOMNIA: Primary | ICD-10-CM

## 2021-09-10 PROCEDURE — 99214 OFFICE O/P EST MOD 30 MIN: CPT | Mod: GT | Performed by: FAMILY MEDICINE

## 2021-09-10 RX ORDER — ZOLPIDEM TARTRATE 5 MG/1
TABLET ORAL
Qty: 30 TABLET | Refills: 0 | Status: SHIPPED | OUTPATIENT
Start: 2021-09-10 | End: 2021-10-11

## 2021-09-10 ASSESSMENT — ANXIETY QUESTIONNAIRES
7. FEELING AFRAID AS IF SOMETHING AWFUL MIGHT HAPPEN: NOT AT ALL
2. NOT BEING ABLE TO STOP OR CONTROL WORRYING: NOT AT ALL
3. WORRYING TOO MUCH ABOUT DIFFERENT THINGS: NOT AT ALL
5. BEING SO RESTLESS THAT IT IS HARD TO SIT STILL: SEVERAL DAYS
7. FEELING AFRAID AS IF SOMETHING AWFUL MIGHT HAPPEN: NOT AT ALL
GAD7 TOTAL SCORE: 6
1. FEELING NERVOUS, ANXIOUS, OR ON EDGE: SEVERAL DAYS
GAD7 TOTAL SCORE: 6
4. TROUBLE RELAXING: MORE THAN HALF THE DAYS
GAD7 TOTAL SCORE: 6
6. BECOMING EASILY ANNOYED OR IRRITABLE: MORE THAN HALF THE DAYS

## 2021-09-10 ASSESSMENT — PATIENT HEALTH QUESTIONNAIRE - PHQ9
SUM OF ALL RESPONSES TO PHQ QUESTIONS 1-9: 12
SUM OF ALL RESPONSES TO PHQ QUESTIONS 1-9: 12
10. IF YOU CHECKED OFF ANY PROBLEMS, HOW DIFFICULT HAVE THESE PROBLEMS MADE IT FOR YOU TO DO YOUR WORK, TAKE CARE OF THINGS AT HOME, OR GET ALONG WITH OTHER PEOPLE: SOMEWHAT DIFFICULT

## 2021-09-10 NOTE — PROGRESS NOTES
Sil is a 50 year old who is being evaluated via a billable video visit.      How would you like to obtain your AVS? MyChart  If the video visit is dropped, the invitation should be resent by: Text to cell phone: [phone]  Will anyone else be joining your video visit? No      Video Start Time: 10:31    Assessment & Plan     Primary insomnia  Glenn is interested in trying zolpidem.  We discussed use of this medication and side effects.  Discussed it is a controlled substance.  Discussed risks of dependence that may develop if this medication is used on a regular basis for long periods of time.  She is only planning to use it short-term until she is able to be seen by the women's health provider to discuss starting progesterone.  She was comfortable with the possible risks of this medication.  Prescription was sent to pharmacy.  Discussed she can try taking 1/2 tablet at bedtime and increase to a full tablet as needed.  She will let me know if there are any concerns or problems with this medication  - zolpidem (AMBIEN) 5 MG tablet  Dispense: 30 tablet; Refill: 0    Anxiety  Continue sertraline.  Continue hydroxyzine as needed               Depression Screening Follow Up    PHQ 9/10/2021   PHQ-9 Total Score 12   Q9: Thoughts of better off dead/self-harm past 2 weeks Not at all                 No follow-ups on file.    Irma Lerner MD  [facility]    Jillian Mujica is a 50 year old who presents for the following health issues     HPI   She is evaluated today via video encounter to discuss medication for insomnia.  She has struggled with sleep troubles for several months.  She was initially prescribed hydroxyzine.  Started at 25 mg daily and then increase to 50 mg daily.  This was not effective and in July she was prescribed trazodone.  She reports that the trazodone does not work as well as she would like to help with sleep.  Describes how she will be able to get a couple of good nights  of sleep and then she will have a few nights where she is not able to sleep well at all even with taking the trazodone.  The lack of restorative sleep is affecting her work and daily activities.  Her sister-in-law was started on progesterone at bedtime and this really helped improve her sister-in-law's sleep issues.  Patient does have an appointment to see a women's health provider in early October to discuss starting progesterone.  Patient is requesting a prescription for a stronger sleep medicine to help her get better sleep over the next couple of weeks.  She has an upcoming trip to [place] next weekend and she does not want her vacation to be affected by her sleep issues.  She is familiar with Ambien which we have discussed in the past and her sister-in-law has also used and she is interested in trying this medication.  We reviewed and updated her current medications and allergies.  She continues to use hydroxyzine on occasion for anxiety.  Finds this to be helpful when she wakes up in the night and feels anxious.  She continues on sertraline as well.  No other concerns or questions today.        Review of Systems         Objective           Vitals:  No vitals were obtained today due to virtual visit.    Physical Exam   GENERAL: Healthy, alert and no distress  EYES: Eyes grossly normal to inspection.  No discharge or erythema, or obvious scleral/conjunctival abnormalities.  RESP: No audible wheeze, cough, or visible cyanosis.  No visible retractions or increased work of breathing.    SKIN: Visible skin clear. No significant rash, abnormal pigmentation or lesions.  NEURO: Cranial nerves grossly intact.  Mentation and speech appropriate for age.  PSYCH: Mentation appears normal, affect normal/bright, judgement and insight intact, normal speech and appearance well-groomed.                Video-Visit Details    Type of service:  Video Visit    Video End Time:10:39    Originating Location (pt. Location):  Home    Distant Location (provider location):  [facility]     Platform used for Video Visit: Jackeline  Answers for HPI/ROS submitted by the patient on 9/10/2021  If you checked off any problems, how difficult have these problems made it for you to do your work, take care of things at home, or get along with other people?: Somewhat difficult  PHQ9 TOTAL SCORE: 12  GIOVANNI 7 TOTAL SCORE: 6

## 2021-09-11 ASSESSMENT — ANXIETY QUESTIONNAIRES: GAD7 TOTAL SCORE: 6

## 2021-09-11 ASSESSMENT — PATIENT HEALTH QUESTIONNAIRE - PHQ9: SUM OF ALL RESPONSES TO PHQ QUESTIONS 1-9: 12

## 2021-10-07 ENCOUNTER — OFFICE VISIT (OUTPATIENT)
Dept: OBGYN | Facility: CLINIC | Age: 51
End: 2021-10-07
Payer: COMMERCIAL

## 2021-10-07 VITALS
WEIGHT: 138.7 LBS | SYSTOLIC BLOOD PRESSURE: 136 MMHG | OXYGEN SATURATION: 98 % | BODY MASS INDEX: 25.52 KG/M2 | HEIGHT: 62 IN | DIASTOLIC BLOOD PRESSURE: 88 MMHG | HEART RATE: 82 BPM

## 2021-10-07 DIAGNOSIS — F41.1 ANXIETY STATE: ICD-10-CM

## 2021-10-07 DIAGNOSIS — E03.8 OTHER SPECIFIED HYPOTHYROIDISM: ICD-10-CM

## 2021-10-07 DIAGNOSIS — Z23 NEED FOR PROPHYLACTIC VACCINATION AND INOCULATION AGAINST INFLUENZA: ICD-10-CM

## 2021-10-07 DIAGNOSIS — G47.09 OTHER INSOMNIA: Primary | ICD-10-CM

## 2021-10-07 LAB
T4 FREE SERPL-MCNC: 0.93 NG/DL (ref 0.76–1.46)
TSH SERPL DL<=0.005 MIU/L-ACNC: 1.23 MU/L (ref 0.4–4)

## 2021-10-07 PROCEDURE — 90471 IMMUNIZATION ADMIN: CPT | Performed by: OBSTETRICS & GYNECOLOGY

## 2021-10-07 PROCEDURE — 99204 OFFICE O/P NEW MOD 45 MIN: CPT | Mod: 25 | Performed by: OBSTETRICS & GYNECOLOGY

## 2021-10-07 PROCEDURE — 36415 COLL VENOUS BLD VENIPUNCTURE: CPT | Performed by: OBSTETRICS & GYNECOLOGY

## 2021-10-07 PROCEDURE — 90686 IIV4 VACC NO PRSV 0.5 ML IM: CPT | Performed by: OBSTETRICS & GYNECOLOGY

## 2021-10-07 PROCEDURE — 84443 ASSAY THYROID STIM HORMONE: CPT | Performed by: OBSTETRICS & GYNECOLOGY

## 2021-10-07 PROCEDURE — 84439 ASSAY OF FREE THYROXINE: CPT | Performed by: OBSTETRICS & GYNECOLOGY

## 2021-10-07 RX ORDER — ETONOGESTREL AND ETHINYL ESTRADIOL VAGINAL RING .015; .12 MG/D; MG/D
1 RING VAGINAL
Qty: 3 EACH | Refills: 4 | Status: SHIPPED | OUTPATIENT
Start: 2021-10-07 | End: 2021-11-24

## 2021-10-07 RX ORDER — BUPROPION HYDROCHLORIDE 150 MG/1
150 TABLET ORAL EVERY MORNING
Qty: 90 TABLET | Refills: 3 | Status: SHIPPED | OUTPATIENT
Start: 2021-10-07 | End: 2021-12-22

## 2021-10-07 ASSESSMENT — MIFFLIN-ST. JEOR: SCORE: 1207.39

## 2021-10-07 NOTE — NURSING NOTE
Clinic Administered Medication Documentation          Injectable Medication Documentation    Patient was given flu vaccine. Prior to medication administration, verified patients identity using patient s name and date of birth. Please see MAR and medication order for additional information. Patient instructed to remain in clinic for 15 minutes.      Was entire vial of medication used? Yes  Vial/Syringe: Single dose vial  Expiration Date:  6/30/22  Was this medication supplied by the patient? No

## 2021-10-07 NOTE — PATIENT INSTRUCTIONS
The menopause manifesto by Alana Blanca     Check thyroid today  Put ring in now, analy on calender when to change it    If not noticing a difference by thanksgiving let me know    Start wellbutrin in the morning daily and after a week can decrease zoloft to 100 mg (see how that goes)

## 2021-10-08 NOTE — PROGRESS NOTES
Menopause management/sleep problem  HPI:  Sil Mancuso is a 51 year old female is a   .  Patient's last menstrual period was 09/25/2021.      Has a long history of never sleeping great and for last 15 years has used a sleep aid on occasion. She can fall asleep but then cannot get back to sleep after she gets up to go the bathroom, etc. Sleeping situation got really bad in June 2021 and she and her primary tried a few different things. Has only felt that Ambien nightly for the last three and half weeks has helped.    Has two boys aged 18 and 16, eldest goes [employer]    Has been on the NuvaRing for a while but libido is so low and with her age, felt she did not need this anymore. Remembers removing sometime in May and then did not have a menses until September 25. Unsure if she is perimenopausal? History of depression and anxiety currently taking Zoloft 150 mg daily.    Here because she wonders if progesterone might help her sleep better?    Past Medical History:   Diagnosis Date     Anxiety      Depression      Disease of thyroid gland      Hypertension 9/2017    Stage 1       Past Surgical History:   Procedure Laterality Date     LUMBAR LAMINECTOMY       SPINE SURGERY         Family History   Problem Relation Age of Onset     Cancer Mother         lung     Alcoholism Father      Mental Illness Father         suicide     Depression Father      Early Death Father         Suicide     Alcoholism Sister      Mental Illness Sister      Alcoholism Sister      Mental Illness Sister      Depression Sister      Depression Sister      Arthritis Maternal Grandfather      Diabetes Paternal Grandmother      Heart Disease Paternal Grandfather      Mental Illness Son         ADHD     Mental Illness Son        Social History     Socioeconomic History     Marital status:      Spouse name: None     Number of children: None     Years of education: None     Highest education level: None   Occupational History     None    Tobacco Use     Smoking status: Never Smoker     Smokeless tobacco: Never Used   Substance and Sexual Activity     Alcohol use: Yes     Alcohol/week: 4.0 standard drinks     Comment: Alcoholic Drinks/day: occ     Drug use: No     Sexual activity: Yes     Partners: Male     Birth control/protection: Condom   Other Topics Concern     None   Social History Narrative     None     Social Determinants of Health     Financial Resource Strain:      Difficulty of Paying Living Expenses:    Food Insecurity:      Worried About Running Out of Food in the Last Year:      Ran Out of Food in the Last Year:    Transportation Needs:      Lack of Transportation (Medical):      Lack of Transportation (Non-Medical):    Physical Activity:      Days of Exercise per Week:      Minutes of Exercise per Session:    Stress:      Feeling of Stress :    Social Connections:      Frequency of Communication with Friends and Family:      Frequency of Social Gatherings with Friends and Family:      Attends Sikhism Services:      Active Member of Clubs or Organizations:      Attends Club or Organization Meetings:      Marital Status:    Intimate Partner Violence:      Fear of Current or Ex-Partner:      Emotionally Abused:      Physically Abused:      Sexually Abused:        Allergies: Patient has no known allergies.    Current Outpatient Medications   Medication Sig Dispense Refill     buPROPion (WELLBUTRIN XL) 150 MG 24 hr tablet Take 1 tablet (150 mg) by mouth every morning 90 tablet 3     etonogestrel-ethinyl estradiol (NUVARING) 0.12-0.015 MG/24HR vaginal ring Place 1 each vaginally every 28 days 3 each 4     cholecalciferol, vitamin D3, 1,000 unit (25 mcg) tablet [CHOLECALCIFEROL, VITAMIN D3, 1,000 UNIT (25 MCG) TABLET] Take 1,000 Units by mouth daily.       dicyclomine (BENTYL) 10 MG capsule [DICYCLOMINE (BENTYL) 10 MG CAPSULE] Take 1 capsule by mouth every 6 hours for up to 14 days 56 capsule 3     hydrOXYzine pamoate (VISTARIL) 50 MG  "capsule [HYDROXYZINE PAMOATE (VISTARIL) 50 MG CAPSULE] Take 1 capsule (50 mg total) by mouth at bedtime as needed (sleep). 90 capsule 3     Lactobacillus rhamnosus GG (CULTURELLE) 10-15 Billion cell capsule [LACTOBACILLUS RHAMNOSUS GG (CULTURELLE) 10-15 BILLION CELL CAPSULE] Take 1 capsule by mouth daily.       levothyroxine (SYNTHROID) 150 MCG tablet [LEVOTHYROXINE (SYNTHROID) 150 MCG TABLET] Take 1 tablet (150 mcg total) by mouth daily. 90 tablet 3     sertraline (ZOLOFT) 100 MG tablet [SERTRALINE (ZOLOFT) 100 MG TABLET] TAKE 1 AND 1/2 TABLETS BY  MOUTH DAILY 135 tablet 2     zolpidem (AMBIEN) 5 MG tablet Take 1/2 to 1 tablet by mouth at bedtime as needed for sleep 30 tablet 0       EXAM:  Blood pressure 136/88, pulse 82, height 1.575 m (5' 2\"), weight 62.9 kg (138 lb 11.2 oz), last menstrual period 09/25/2021, SpO2 98 %, not currently breastfeeding.  BMI= Body mass index is 25.37 kg/m .  Patient's last menstrual period was 09/25/2021.  General - pleasant female in no acute distress.  Neurological - alert and oriented X 3  Psychiatric - normal mood and affect    prep time day of service 5 min  visit time with the patient 23 min  post visit work including documentation time 7 min  Total time: 35 min      ASSESSMENT/PLAN:  (G47.09) Other insomnia  (primary encounter diagnosis)  Comment: Perimenopausal  Plan: etonogestrel-ethinyl estradiol (NUVARING)         0.12-0.015 MG/24HR vaginal ring        Discussed sleep issues definitely can be a component of perimenopause and interestingly were mostly seen after she stopped her NuvaRing. We discussed going back on the NuvaRing with risks and benefits. Reviewed NuvaRing will help protect against bone loss, acne, hot flashes and night sweats, mood changes with cycles. Discussed risks of blood clotting which is low in a healthy patient. Would recommend she stay on the NuvaRing until at least age 53-55 and then could transition to hormone replacement therapy if " desires.    Suggested she do some reading on this topic and recommended the menopause manifesto book. Questions were answered and she will let me know if not sleeping better on the NuvaRing by Thanksgiving.    (Z23) Need for prophylactic vaccination and inoculation against influenza  Plan: INFLUENZA VACCINE IM > 6 MONTHS VALENT IIV4         (AFLURIA/FLUZONE)    (E03.8) Other specified hypothyroidism  Comment: On Synthroid 150 mcg  Plan: TSH, T4, free        Time for a recheck of her levels to make sure not attributing to issues.    (F41.1) Anxiety  Comment: On Zoloft 150 mg  Plan: buPROPion (WELLBUTRIN XL) 150 MG 24 hr tablet        Discussed common to have sexual issues on Zoloft and needs increase as dosage increases. We'll add Wellbutrin to combat sexual side effects and decrease Zoloft dose to 100 mg and see how she is feeling on that combination.      KATELIN DEE MD

## 2021-10-11 ENCOUNTER — MYC MEDICAL ADVICE (OUTPATIENT)
Dept: OBGYN | Facility: CLINIC | Age: 51
End: 2021-10-11

## 2021-10-11 DIAGNOSIS — F51.01 PRIMARY INSOMNIA: ICD-10-CM

## 2021-10-11 RX ORDER — ZOLPIDEM TARTRATE 5 MG/1
TABLET ORAL
Qty: 30 TABLET | Refills: 0 | Status: SHIPPED | OUTPATIENT
Start: 2021-10-11 | End: 2022-12-17

## 2021-10-11 NOTE — TELEPHONE ENCOUNTER
Nope, I just forgot. lol    And I do think sleep will be better on the ring    Refill sent  Amina Fry MD

## 2021-10-11 NOTE — TELEPHONE ENCOUNTER
Pt had visit with you 10/7/21 and ambien wasn't refilled at that time.  Do you want to refill that for her now or was she supposed to see if going back on the nuvaring would help with her sleep?  Nemo Lewis, RN

## 2021-11-04 DIAGNOSIS — F41.1 ANXIETY STATE: ICD-10-CM

## 2021-11-07 RX ORDER — SERTRALINE HYDROCHLORIDE 100 MG/1
TABLET, FILM COATED ORAL
Qty: 135 TABLET | Refills: 0 | Status: SHIPPED | OUTPATIENT
Start: 2021-11-07 | End: 2022-01-31

## 2021-11-07 NOTE — TELEPHONE ENCOUNTER
"Last Written Prescription Date:  3/3/21  Last Fill Quantity: 90,  # refills: 3   Last office visit provider:  12/10/20     Requested Prescriptions   Pending Prescriptions Disp Refills     sertraline (ZOLOFT) 100 MG tablet [Pharmacy Med Name: Sertraline HCl 100 MG Oral Tablet] 135 tablet 3     Sig: TAKE 1 AND 1/2 TABLETS BY  MOUTH DAILY       SSRIs Protocol Passed - 11/4/2021  9:58 PM        Passed - Recent (12 mo) or future (30 days) visit within the authorizing provider's specialty     Patient has had an office visit with the authorizing provider or a provider within the authorizing providers department within the previous 12 mos or has a future within next 30 days. See \"Patient Info\" tab in inbasket, or \"Choose Columns\" in Meds & Orders section of the refill encounter.              Passed - Medication is active on med list        Passed - Patient is age 18 or older        Passed - No active pregnancy on record        Passed - No positive pregnancy test in last 12 months             jude orozco RN 11/07/21 10:59 AM  "

## 2021-11-22 DIAGNOSIS — G47.09 OTHER INSOMNIA: ICD-10-CM

## 2021-11-24 RX ORDER — ETONOGESTREL/ETHINYL ESTRADIOL .12-.015MG
RING, VAGINAL VAGINAL
Qty: 3 EACH | Refills: 2 | Status: SHIPPED | OUTPATIENT
Start: 2021-11-24 | End: 2023-06-21

## 2021-12-05 ENCOUNTER — HEALTH MAINTENANCE LETTER (OUTPATIENT)
Age: 51
End: 2021-12-05

## 2021-12-22 ENCOUNTER — OFFICE VISIT (OUTPATIENT)
Dept: FAMILY MEDICINE | Facility: CLINIC | Age: 51
End: 2021-12-22
Payer: COMMERCIAL

## 2021-12-22 VITALS
SYSTOLIC BLOOD PRESSURE: 124 MMHG | DIASTOLIC BLOOD PRESSURE: 88 MMHG | BODY MASS INDEX: 25.17 KG/M2 | OXYGEN SATURATION: 98 % | WEIGHT: 136.8 LBS | HEART RATE: 78 BPM | HEIGHT: 62 IN

## 2021-12-22 DIAGNOSIS — Z00.00 ROUTINE HISTORY AND PHYSICAL EXAMINATION OF ADULT: Primary | ICD-10-CM

## 2021-12-22 DIAGNOSIS — F32.5 MAJOR DEPRESSION IN REMISSION (H): ICD-10-CM

## 2021-12-22 DIAGNOSIS — Z30.44 ENCOUNTER FOR SURVEILLANCE OF VAGINAL RING HORMONAL CONTRACEPTIVE DEVICE: ICD-10-CM

## 2021-12-22 DIAGNOSIS — F41.9 ANXIETY: ICD-10-CM

## 2021-12-22 DIAGNOSIS — G47.00 INSOMNIA, UNSPECIFIED TYPE: ICD-10-CM

## 2021-12-22 DIAGNOSIS — Z12.31 VISIT FOR SCREENING MAMMOGRAM: ICD-10-CM

## 2021-12-22 DIAGNOSIS — E03.9 HYPOTHYROIDISM, UNSPECIFIED TYPE: ICD-10-CM

## 2021-12-22 DIAGNOSIS — N84.1 CERVICAL POLYP: ICD-10-CM

## 2021-12-22 PROBLEM — F32.9 MAJOR DEPRESSION, SINGLE EPISODE: Status: ACTIVE | Noted: 2021-12-22

## 2021-12-22 PROCEDURE — 99396 PREV VISIT EST AGE 40-64: CPT | Performed by: FAMILY MEDICINE

## 2021-12-22 PROCEDURE — 87624 HPV HI-RISK TYP POOLED RSLT: CPT | Performed by: FAMILY MEDICINE

## 2021-12-22 PROCEDURE — G0123 SCREEN CERV/VAG THIN LAYER: HCPCS | Performed by: FAMILY MEDICINE

## 2021-12-22 RX ORDER — BUPROPION HYDROCHLORIDE 300 MG/1
300 TABLET ORAL EVERY MORNING
Qty: 90 TABLET | Refills: 3 | Status: SHIPPED | OUTPATIENT
Start: 2021-12-22 | End: 2023-02-02

## 2021-12-22 ASSESSMENT — ANXIETY QUESTIONNAIRES
6. BECOMING EASILY ANNOYED OR IRRITABLE: SEVERAL DAYS
GAD7 TOTAL SCORE: 4
3. WORRYING TOO MUCH ABOUT DIFFERENT THINGS: SEVERAL DAYS
4. TROUBLE RELAXING: SEVERAL DAYS
5. BEING SO RESTLESS THAT IT IS HARD TO SIT STILL: NOT AT ALL
2. NOT BEING ABLE TO STOP OR CONTROL WORRYING: NOT AT ALL
GAD7 TOTAL SCORE: 4
1. FEELING NERVOUS, ANXIOUS, OR ON EDGE: SEVERAL DAYS
7. FEELING AFRAID AS IF SOMETHING AWFUL MIGHT HAPPEN: NOT AT ALL
GAD7 TOTAL SCORE: 4
7. FEELING AFRAID AS IF SOMETHING AWFUL MIGHT HAPPEN: NOT AT ALL

## 2021-12-22 ASSESSMENT — MIFFLIN-ST. JEOR: SCORE: 1202.74

## 2021-12-22 ASSESSMENT — PATIENT HEALTH QUESTIONNAIRE - PHQ9
10. IF YOU CHECKED OFF ANY PROBLEMS, HOW DIFFICULT HAVE THESE PROBLEMS MADE IT FOR YOU TO DO YOUR WORK, TAKE CARE OF THINGS AT HOME, OR GET ALONG WITH OTHER PEOPLE: NOT DIFFICULT AT ALL
SUM OF ALL RESPONSES TO PHQ QUESTIONS 1-9: 5
SUM OF ALL RESPONSES TO PHQ QUESTIONS 1-9: 5

## 2021-12-22 NOTE — PATIENT INSTRUCTIONS
I recommend you see a gynecologist for removal of cervical polyp    Please schedule an appointment for fasting labs

## 2021-12-22 NOTE — PROGRESS NOTES
SUBJECTIVE:   CC: Sil Mancuso is an 51 year old woman who presents for preventive health visit.   Reviewed her health history.  Reviewed and updated medications, allergies, family and social history.  She has underlying hypothyroidism and continues on levothyroxine.  Had thyroid labs done in October.  She has history of anxiety and depression.  She is on sertraline 150 mg daily.  Was recently started on Wellbutrin 150 mg daily in addition to this by her gynecologist.  She really has not noticed much difference in her mood symptoms.  She has history of sleep difficulties.  She has found that using her NuvaRing continuously has helped improve her sleep.  She also takes hydroxyzine at bedtime and that helps her relax and to reduce her anxiety.  She has zolpidem on hand to use if needed.  She has history of IBS.  This has been stable.  She is due for a mammogram and a Pap smear.  She does not get regular vision exams.  She goes to the dentist regularly.  She is exercising regularly and tries to follow a healthy diet.  Review of systems is assessed and is otherwise negative.  No other concerns today.    Patient has been advised of split billing requirements and indicates understanding: Yes  Healthy Habits:     Getting at least 3 servings of Calcium per day:  Yes    Bi-annual eye exam:  NO    Dental care twice a year:  Yes    Sleep apnea or symptoms of sleep apnea:  Daytime drowsiness    Diet:  Regular (no restrictions)    Frequency of exercise:  2-3 days/week    Duration of exercise:  15-30 minutes    Taking medications regularly:  Yes    Medication side effects:  Not applicable    PHQ-2 Total Score: 2    Additional concerns today:  No              Today's PHQ-2 Score:   PHQ-2 ( 1999 Pf[employer]) 12/22/2021   Q1: Little interest or pleasure in doing things 1   Q2: Feeling down, depressed or hopeless 1   PHQ-2 Score 2   Q1: Little interest or pleasure in doing things Several days   Q2: Feeling down, depressed or  hopeless Several days   PHQ-2 Score 2       Abuse: Current or Past (Physical, Sexual or Emotional) - No  Do you feel safe in your environment? Yes    Have you ever done Advance Care Planning? (For example, a Health Directive, POLST, or a discussion with a medical provider or your loved ones about your wishes): No, advance care planning information given to patient to review.  Patient declined advance care planning discussion at this time.    Social History     Tobacco Use     Smoking status: Never Smoker     Smokeless tobacco: Never Used   Substance Use Topics     Alcohol use: Yes     Alcohol/week: 4.0 standard drinks     Comment: Alcoholic Drinks/day: occ         Alcohol Use 12/22/2021   Prescreen: >3 drinks/day or >7 drinks/week? No       Reviewed orders with patient.  Reviewed health maintenance and updated orders accordingly - Yes  Current Outpatient Medications   Medication Sig Dispense Refill     buPROPion (WELLBUTRIN XL) 300 MG 24 hr tablet Take 1 tablet (300 mg) by mouth every morning 90 tablet 3     cholecalciferol, vitamin D3, 1,000 unit (25 mcg) tablet [CHOLECALCIFEROL, VITAMIN D3, 1,000 UNIT (25 MCG) TABLET] Take 1,000 Units by mouth daily.       hydrOXYzine pamoate (VISTARIL) 50 MG capsule [HYDROXYZINE PAMOATE (VISTARIL) 50 MG CAPSULE] Take 1 capsule (50 mg total) by mouth at bedtime as needed (sleep). 90 capsule 3     Lactobacillus rhamnosus GG (CULTURELLE) 10-15 Billion cell capsule [LACTOBACILLUS RHAMNOSUS GG (CULTURELLE) 10-15 BILLION CELL CAPSULE] Take 1 capsule by mouth daily.       levothyroxine (SYNTHROID) 150 MCG tablet [LEVOTHYROXINE (SYNTHROID) 150 MCG TABLET] Take 1 tablet (150 mcg total) by mouth daily. 90 tablet 3     NUVARING 0.12-0.015 MG/24HR vaginal ring INSERT 1 RING VAGINALLY,  LEAVE IN PLACE FOR 3 WEEKS, REMOVE FOR 1 WEEK, THEN  INSERT NEW RING 3 each 2     sertraline (ZOLOFT) 100 MG tablet TAKE 1 AND 1/2 TABLETS BY  MOUTH DAILY 135 tablet 0     zolpidem (AMBIEN) 5 MG tablet Take  1/2 to 1 tablet by mouth at bedtime as needed for sleep 30 tablet 0       Breast Cancer Screening:    Breast CA Risk Assessment (FHS-7) 12/22/2021   Do you have a family history of breast, colon, or ovarian cancer? No / Unknown         Mammogram Screening: Recommended annual mammography  Pertinent mammograms are reviewed under the imaging tab.    History of abnormal Pap smear: NO - age 30- 65 PAP every 3 years recommended  PAP / HPV Latest Ref Rng & Units 4/26/2018   PAP - Atypical squamous cells of undetermined significance  Electronically signed by Cyn Chino MD on 5/2/2018 at  4:24 PM     HPV16 NEG Negative   HPV18 NEG Negative   HRHPV NEG Negative     Reviewed and updated as needed this visit by clinical staff   Allergies  Meds             Reviewed and updated as needed this visit by Provider                   Review of Systems  See HPI     OBJECTIVE:   There were no vitals taken for this visit.  Physical Exam  GENERAL: healthy, alert and no distress  EYES: Eyes grossly normal to inspection, PERRL and conjunctivae and sclerae normal  HENT: normal cephalic/atraumatic and ear canals and TM's normal  RESP: lungs clear to auscultation - no rales, rhonchi or wheezes  BREAST: normal without masses, tenderness or nipple discharge and no palpable axillary masses or adenopathy  CV: regular rate and rhythm, normal S1 S2, no S3 or S4, no murmur, click or rub, no peripheral edema and peripheral pulses strong  ABDOMEN: soft, nontender, no hepatosplenomegaly, no masses and bowel sounds normal   (female): normal female external genitalia, normal urethral meatus, vaginal mucosa pink, moist, well rugated, and normal cervix/adnexa/uterus without masses or discharge, cervical polyp present  MS: no gross musculoskeletal defects noted, no edema  SKIN: no suspicious lesions or rashes  NEURO: Normal strength and tone, mentation intact and speech normal  PSYCH: mentation appears normal, affect  normal/bright        ASSESSMENT/PLAN:   Sil was seen today for physical.    Diagnoses and all orders for this visit:    Routine history and physical examination of adult  -     Pap screen with HPV - recommended age 30 - 65 years  -     Lipid panel reflex to direct LDL Fasting; Future  -     Glucose; Future  -     Vitamin D Deficiency; Future  -She will schedule an appointment for her Covid booster after the holidays.  Vaccines otherwise up-to-date.  She is not fasting so she will return for fasting labs.  Future orders placed.  Order placed for mammogram.  Pap smear performed as last Pap in 2018 showed ASCUS with negative HPV.  Encouraged regular exercise and healthy diet.  Encouraged adequate calcium intake.  Continue with regular exercise.  Encouraged a vision exam.  Continue with regular dental exams.  Follow-up in 1 year for annual physical    Visit for screening mammogram  -     MA Screen Bilateral w/Rohit; Future    Major depression in remission (H)  -     buPROPion (WELLBUTRIN XL) 300 MG 24 hr tablet; Take 1 tablet (300 mg) by mouth every morning  -     Vitamin D Deficiency; Future  -She will continue sertraline 150 mg daily.  She would like to try increasing dose of bupropion so new prescription for bupropion 300 mg daily was sent to pharmacy.  We will check vitamin D when she comes in for fasting labs.  Continue with regular exercise and self cares    Cervical polyp  She has a cervical polyp on exam.  Recommend that she see gynecologist for removal    Hypothyroidism, unspecified type  Stable on levothyroxine.  Recent thyroid labs in October were normal    Anxiety  Continue sertraline at current dose.  Continue hydroxyzine as needed    Insomnia, unspecified type  Improved sleep with continuous use of NuvaRing.  She continues hydroxyzine at bedtime as well.  Has zolpidem to use as needed    Encounter for surveillance of vaginal ring hormonal contraceptive device  Doing well with NuvaRing      Patient has  "been advised of split billing requirements and indicates understanding: Yes  COUNSELING:  Reviewed preventive health counseling, as reflected in patient instructions    Estimated body mass index is 25.37 kg/m  as calculated from the following:    Height as of 10/7/21: 1.575 m (5' 2\").    Weight as of 10/7/21: 62.9 kg (138 lb 11.2 oz).        She reports that she has never smoked. She has never used smokeless tobacco.      Counseling Resources:  ATP IV Guidelines  Pooled Cohorts Equation Calculator  Breast Cancer Risk Calculator  BRCA-Related Cancer Risk Assessment: FHS-7 Tool  FRAX Risk Assessment  ICSI Preventive Guidelines  Dietary Guidelines for Americans, 2010  [employer]'s MyPlate  ASA Prophylaxis  Lung CA Screening    Irma Lerner MD  [facility]  Answers for HPI/ROS submitted by the patient on 12/22/2021  If you checked off any problems, how difficult have these problems made it for you to do your work, take care of things at home, or get along with other people?: Not difficult at all  PHQ9 TOTAL SCORE: 5  GIOVANNI 7 TOTAL SCORE: 4      "

## 2021-12-23 ASSESSMENT — PATIENT HEALTH QUESTIONNAIRE - PHQ9: SUM OF ALL RESPONSES TO PHQ QUESTIONS 1-9: 5

## 2021-12-23 ASSESSMENT — ANXIETY QUESTIONNAIRES: GAD7 TOTAL SCORE: 4

## 2021-12-24 LAB
HUMAN PAPILLOMA VIRUS 16 DNA: NEGATIVE
HUMAN PAPILLOMA VIRUS 18 DNA: NEGATIVE
HUMAN PAPILLOMA VIRUS FINAL DIAGNOSIS: NORMAL
HUMAN PAPILLOMA VIRUS OTHER HR: NEGATIVE

## 2021-12-29 LAB
BKR LAB AP GYN ADEQUACY: NORMAL
BKR LAB AP GYN INTERPRETATION: NORMAL
BKR LAB AP HPV REFLEX: NORMAL
BKR LAB AP PREVIOUS ABNL DX: NORMAL
BKR LAB AP PREVIOUS ABNORMAL: NORMAL
PATH REPORT.COMMENTS IMP SPEC: NORMAL
PATH REPORT.COMMENTS IMP SPEC: NORMAL
PATH REPORT.RELEVANT HX SPEC: NORMAL

## 2021-12-30 PROBLEM — Z98.890 H/O LEEP: Status: ACTIVE | Noted: 2021-12-30

## 2022-01-07 ENCOUNTER — OFFICE VISIT (OUTPATIENT)
Dept: OBGYN | Facility: CLINIC | Age: 52
End: 2022-01-07
Payer: COMMERCIAL

## 2022-01-07 VITALS
OXYGEN SATURATION: 94 % | DIASTOLIC BLOOD PRESSURE: 90 MMHG | WEIGHT: 136.75 LBS | SYSTOLIC BLOOD PRESSURE: 132 MMHG | HEART RATE: 90 BPM | HEIGHT: 62 IN | BODY MASS INDEX: 25.17 KG/M2

## 2022-01-07 DIAGNOSIS — N84.1 CERVICAL POLYP: Primary | ICD-10-CM

## 2022-01-07 DIAGNOSIS — Z12.12 SCREENING FOR MALIGNANT NEOPLASM OF THE RECTUM: ICD-10-CM

## 2022-01-07 PROCEDURE — 57500 BIOPSY OF CERVIX: CPT | Performed by: OBSTETRICS & GYNECOLOGY

## 2022-01-07 PROCEDURE — 88305 TISSUE EXAM BY PATHOLOGIST: CPT | Performed by: PATHOLOGY

## 2022-01-07 ASSESSMENT — MIFFLIN-ST. JEOR: SCORE: 1202.51

## 2022-01-11 LAB
PATH REPORT.COMMENTS IMP SPEC: NORMAL
PATH REPORT.COMMENTS IMP SPEC: NORMAL
PATH REPORT.FINAL DX SPEC: NORMAL
PATH REPORT.GROSS SPEC: NORMAL
PATH REPORT.MICROSCOPIC SPEC OTHER STN: NORMAL
PATH REPORT.RELEVANT HX SPEC: NORMAL
PHOTO IMAGE: NORMAL

## 2022-01-28 DIAGNOSIS — F41.1 ANXIETY STATE: ICD-10-CM

## 2022-01-31 RX ORDER — SERTRALINE HYDROCHLORIDE 100 MG/1
TABLET, FILM COATED ORAL
Qty: 135 TABLET | Refills: 2 | Status: SHIPPED | OUTPATIENT
Start: 2022-01-31 | End: 2022-07-19

## 2022-01-31 NOTE — TELEPHONE ENCOUNTER
"Last Written Prescription Date:  11/7/21  Last Fill Quantity: 135,  # refills: 0   Last office visit provider:  12/22/21     Requested Prescriptions   Pending Prescriptions Disp Refills     sertraline (ZOLOFT) 100 MG tablet [Pharmacy Med Name: Sertraline HCl 100 MG Oral Tablet] 135 tablet 3     Sig: TAKE 1 AND 1/2 TABLETS BY  MOUTH DAILY       SSRIs Protocol Passed - 1/28/2022  9:29 PM        Passed - Recent (12 mo) or future (30 days) visit within the authorizing provider's specialty     Patient has had an office visit with the authorizing provider or a provider within the authorizing providers department within the previous 12 mos or has a future within next 30 days. See \"Patient Info\" tab in inbasket, or \"Choose Columns\" in Meds & Orders section of the refill encounter.              Passed - Medication is active on med list        Passed - Patient is age 18 or older        Passed - No active pregnancy on record        Passed - No positive pregnancy test in last 12 months             Sophia Bautista RN 01/31/22 12:24 PM  "

## 2022-02-03 ENCOUNTER — TELEPHONE (OUTPATIENT)
Dept: GASTROENTEROLOGY | Facility: CLINIC | Age: 52
End: 2022-02-03
Payer: COMMERCIAL

## 2022-02-03 DIAGNOSIS — Z11.59 ENCOUNTER FOR SCREENING FOR OTHER VIRAL DISEASES: Primary | ICD-10-CM

## 2022-02-03 NOTE — TELEPHONE ENCOUNTER
Patient scheduled for colonoscopy on 2.10.2022.     Covid test scheduled: 2.7.2022    Arrival time: 0930    Facility location: [facility]    Sedation type: MAC    Indication for procedure: screening colonoscopy    Referring provider: Amina Fry MD    Bowel prep recommendation: Miralax/Magnesium citrate/Dulcolax    Pre visit planning completed.    Gavi Torre RN

## 2022-02-03 NOTE — TELEPHONE ENCOUNTER
Screening Questions  Blue=prep questions Red=location Green=sedation   1. Are you active on mychart? y    2. What insurance is in the chart? [employer]     3.  Ordering/Referring Provider:Lisandra     4. BMI 23.6, If greater than 40 review exclusion criteria also will need EXTENDED PREP    5.  Respiratory Screening (If yes to any of the following HOSPITAL setting only):     Do you use daily home oxygen? n  Do you have mod to severe Obstructive Sleep Apnea? n (can be seen at [facility] or hospital setting)    Do you have Pulmonary Hypertension? n   Do you have UNCONTROLLED asthma? n    6. Have you had a heart or lung transplant? n  (If yes, please review exclusion criteria)    7. Are you currently on dialysis?n  (If yes, schedule in HOSPITAL setting only)(If yes, please send Golytely prep)    8. Do you have chronic kidney disease? n (If yes, please send Golytely prep)    9. Have you had a stroke or Transient ischemic attack (TIA) within 6 months? n (If yes, do not schedule at [facility])    10. In the past 6 months, have you had any heart related issues including cardiomyopathy or heart attack? n (If yes, please review exclusion criteria)           If yes, did it require cardiac stenting or other implantable device?n  (If yes, please review exclusion criteria)      11. Do you have any implantable devices in your body (pacemaker, defib, LVAD)? n (If yes, schedule at [facility])    12. Do you take nitroglycerin? If yes, how often? n (if yes, schedule at HOSPITAL setting)    13. Are you currently taking any blood thinners?n (If yes- inform patient to follow up with PCP or provider for follow up instructions)     14. Are you a diabetic? n (If yes, please send Golytely prep)    15. (Females) Are you currently pregnant?   If yes, how many weeks?      16. Are you taking any prescription pain medications on a routine schedule? n If yes, MAC sedation and patient will need EXTENDED PREP.    17. Do you have any chemical dependencies such as alcohol, street  drugs, or methadone? n If yes, MAC sedation     18. Do you have any history of post-traumatic stress syndrome, severe anxiety or history of psychosis? n  If yes, MAC sedation.     19. Do you transfer independently? y    20.  Do you have any issues with constipation? n   If yes, pt will need EXTENDED PREP     21. Preferred Pharmacy for Pre Prescription [facility] [id number] [place] [address]    Scheduling Details    Which Colonoscopy Prep was Sent?: Miralax  Type of Procedure Scheduled: Colonoscopy  Surgeon: Abram  Date of Procedure: 2/10/22  Location: [facility]  Caller (Please ask for phone number if not scheduled by patient): Sil Mancuso        Sedation Type: MAC  Conscious Sedation- Needs  for 6 hours after the procedure  MAC/General-Needs  for 24 hours after procedure    Pre-op Required at [facility], [place], [place] and OR for MAC sedation: n  (if yes advise patient they will need a pre-op prior to procedure)      Informed patient they will need an adult  y  Cannot take any type of public or medical transportation alone    Pre-Procedure Covid test to be completed at [facility] or Externally: y    Confirmed Nurse will call to complete assessment y    Additional comments:  ([facility] PATIENTS NEED EXTENDED PREP)

## 2022-02-05 NOTE — TELEPHONE ENCOUNTER
Attempted to contact patient for pre assessment questions. No answer.     Left message to return call to [phone] #2    Will send [employer] message to return call.    Sobeida Chaparro RN

## 2022-02-07 ENCOUNTER — LAB (OUTPATIENT)
Dept: LAB | Facility: CLINIC | Age: 52
End: 2022-02-07
Payer: COMMERCIAL

## 2022-02-07 DIAGNOSIS — Z11.59 ENCOUNTER FOR SCREENING FOR OTHER VIRAL DISEASES: ICD-10-CM

## 2022-02-07 PROCEDURE — U0003 INFECTIOUS AGENT DETECTION BY NUCLEIC ACID (DNA OR RNA); SEVERE ACUTE RESPIRATORY SYNDROME CORONAVIRUS 2 (SARS-COV-2) (CORONAVIRUS DISEASE [COVID-19]), AMPLIFIED PROBE TECHNIQUE, MAKING USE OF HIGH THROUGHPUT TECHNOLOGIES AS DESCRIBED BY CMS-2020-01-R: HCPCS

## 2022-02-07 PROCEDURE — U0005 INFEC AGEN DETEC AMPLI PROBE: HCPCS

## 2022-02-07 NOTE — TELEPHONE ENCOUNTER
Patient returned call.     Pre assessment questions completed for upcoming colonoscopy procedure scheduled on 2/10/22    COVID test scheduled 2/7/22 - in process.     Reviewed procedural arrival time 0930 and facility location [facility].    Designated  policy reviewed. Instructed to have someone stay 24 hours post procedure.     Reviewed Miralax prep instructions with patient. No fiber/iron supplements or foods that contain nuts/seeds 7 days prior to procedure.     Patient verbalized understanding and had no questions or concerns at this time.    Sobeida Chaparro RN

## 2022-02-08 LAB — SARS-COV-2 RNA RESP QL NAA+PROBE: NEGATIVE

## 2022-02-10 ENCOUNTER — DOCUMENTATION ONLY (OUTPATIENT)
Dept: GASTROENTEROLOGY | Facility: OUTPATIENT CENTER | Age: 52
End: 2022-02-10
Payer: COMMERCIAL

## 2022-02-10 ENCOUNTER — TRANSFERRED RECORDS (OUTPATIENT)
Dept: HEALTH INFORMATION MANAGEMENT | Facility: CLINIC | Age: 52
End: 2022-02-10
Payer: COMMERCIAL

## 2022-02-10 DIAGNOSIS — F51.01 PRIMARY INSOMNIA: ICD-10-CM

## 2022-02-14 RX ORDER — HYDROXYZINE PAMOATE 50 MG/1
CAPSULE ORAL
Qty: 90 CAPSULE | Refills: 3 | Status: SHIPPED | OUTPATIENT
Start: 2022-02-14 | End: 2023-04-19

## 2022-02-14 NOTE — TELEPHONE ENCOUNTER
"Last Written Prescription Date:  2/9/21  Last Fill Quantity: 90,  # refills: 3   Last office visit provider:  12/22/21     Requested Prescriptions   Pending Prescriptions Disp Refills     hydrOXYzine (VISTARIL) 50 MG capsule [Pharmacy Med Name: HYDROXYZINE NIRAJ 50 MG CAP] 90 capsule 3     Sig: TAKE 1 CAPSULE (50 MG TOTAL) BY MOUTH AT BEDTIME AS NEEDED (SLEEP).       Antihistamines Protocol Passed - 2/14/2022  7:45 AM        Passed - Recent (12 mo) or future (30 days) visit within the authorizing provider's specialty     Patient has had an office visit with the authorizing provider or a provider within the authorizing providers department within the previous 12 mos or has a future within next 30 days. See \"Patient Info\" tab in inbasket, or \"Choose Columns\" in Meds & Orders section of the refill encounter.              Passed - Patient is age 3 or older     Apply age and/or weight-based dosing for peds patients age 3 and older.    Forward request to provider for patients under the age of 3.          Passed - Medication is active on med list             Roger Oneal RN 02/14/22 7:45 AM  "

## 2022-02-21 DIAGNOSIS — E03.9 ACQUIRED HYPOTHYROIDISM: ICD-10-CM

## 2022-02-22 RX ORDER — LEVOTHYROXINE SODIUM 150 UG/1
TABLET ORAL
Qty: 90 TABLET | Refills: 1 | Status: SHIPPED | OUTPATIENT
Start: 2022-02-22 | End: 2022-08-25

## 2022-02-22 NOTE — TELEPHONE ENCOUNTER
"Last Written Prescription Date:  2/9/2021  Last Fill Quantity: 90,  # refills: 3   Last office visit provider:  12/22/2021 Dr. Lerner     levothyroxine (SYNTHROID) 150 MCG tablet 90 tablet 3 2/9/2021  No   Sig - Route: Take 1 tablet (150 mcg total) by mouth daily. - Oral   Sent to pharmacy as: levothyroxine 150 mcg tablet (Synthroid)   E-Prescribing Status: Receipt confirmed by pharmacy (2/9/2021  9:06 AM CST         Requested Prescriptions   Pending Prescriptions Disp Refills     levothyroxine (SYNTHROID/LEVOTHROID) 150 MCG tablet [Pharmacy Med Name: LEVOTHYROXINE 150 MCG TABLET] 90 tablet 3     Sig: TAKE 1 TABLET BY MOUTH EVERY DAY       Thyroid Protocol Passed - 2/21/2022  3:41 PM        Passed - Patient is 12 years or older        Passed - Recent (12 mo) or future (30 days) visit within the authorizing provider's specialty     Patient has had an office visit with the authorizing provider or a provider within the authorizing providers department within the previous 12 mos or has a future within next 30 days. See \"Patient Info\" tab in inbasket, or \"Choose Columns\" in Meds & Orders section of the refill encounter.              Passed - Medication is active on med list        Passed - Normal TSH on file in past 12 months     Recent Labs   Lab Test 10/07/21  1148   TSH 1.23              Passed - No active pregnancy on record     If patient is pregnant or has had a positive pregnancy test, please check TSH.          Passed - No positive pregnancy test in past 12 months     If patient is pregnant or has had a positive pregnancy test, please check TSH.               Priscila Ambrocio RN 02/22/22 1:39 PM  "

## 2022-03-27 ENCOUNTER — HEALTH MAINTENANCE LETTER (OUTPATIENT)
Age: 52
End: 2022-03-27

## 2022-04-05 DIAGNOSIS — F51.01 PRIMARY INSOMNIA: ICD-10-CM

## 2022-04-06 RX ORDER — TRAZODONE HYDROCHLORIDE 50 MG/1
TABLET, FILM COATED ORAL
Qty: 90 TABLET | Refills: 1 | OUTPATIENT
Start: 2022-04-06

## 2022-04-06 NOTE — TELEPHONE ENCOUNTER
Outpatient Medication Detail     Disp Refills Start End RILEY   traZODone (DESYREL) 50 MG tablet (Discontinued) 90 tablet 1 7/26/2021 9/10/2021 --   Sig - Route: Take 1 tablet (50 mg) by mouth nightly as needed for sleep - Oral   Sent to pharmacy as: traZODone HCl 50 MG Oral Tablet (DESYREL)   Class: E-Prescribe   Order: [id number]   E-Prescribing Status: Receipt confirmed by pharmacy (7/26/2021 12:54 PM CDT)

## 2022-06-09 ENCOUNTER — MYC MEDICAL ADVICE (OUTPATIENT)
Dept: FAMILY MEDICINE | Facility: CLINIC | Age: 52
End: 2022-06-09
Payer: COMMERCIAL

## 2022-06-09 DIAGNOSIS — E03.9 ACQUIRED HYPOTHYROIDISM: Primary | ICD-10-CM

## 2022-06-13 ENCOUNTER — LAB (OUTPATIENT)
Dept: LAB | Facility: CLINIC | Age: 52
End: 2022-06-13
Payer: COMMERCIAL

## 2022-06-13 DIAGNOSIS — Z00.00 ROUTINE HISTORY AND PHYSICAL EXAMINATION OF ADULT: ICD-10-CM

## 2022-06-13 DIAGNOSIS — F32.5 MAJOR DEPRESSION IN REMISSION (H): ICD-10-CM

## 2022-06-13 DIAGNOSIS — E03.9 ACQUIRED HYPOTHYROIDISM: ICD-10-CM

## 2022-06-13 LAB
CHOLEST SERPL-MCNC: 204 MG/DL
DEPRECATED CALCIDIOL+CALCIFEROL SERPL-MC: 65 UG/L (ref 20–75)
FASTING STATUS PATIENT QL REPORTED: NO
FASTING STATUS PATIENT QL REPORTED: NO
GLUCOSE BLD-MCNC: 88 MG/DL (ref 70–125)
HDLC SERPL-MCNC: 79 MG/DL
LDLC SERPL CALC-MCNC: 102 MG/DL
T4 FREE SERPL-MCNC: 0.96 NG/DL (ref 0.7–1.8)
TRIGL SERPL-MCNC: 116 MG/DL
TSH SERPL DL<=0.005 MIU/L-ACNC: 2.32 UIU/ML (ref 0.3–5)

## 2022-06-13 PROCEDURE — 80061 LIPID PANEL: CPT

## 2022-06-13 PROCEDURE — 36415 COLL VENOUS BLD VENIPUNCTURE: CPT

## 2022-06-13 PROCEDURE — 82306 VITAMIN D 25 HYDROXY: CPT

## 2022-06-13 PROCEDURE — 84439 ASSAY OF FREE THYROXINE: CPT

## 2022-06-13 PROCEDURE — 82947 ASSAY GLUCOSE BLOOD QUANT: CPT

## 2022-06-13 PROCEDURE — 84443 ASSAY THYROID STIM HORMONE: CPT

## 2022-07-19 ENCOUNTER — MYC MEDICAL ADVICE (OUTPATIENT)
Dept: FAMILY MEDICINE | Facility: CLINIC | Age: 52
End: 2022-07-19

## 2022-07-19 DIAGNOSIS — F41.1 ANXIETY STATE: ICD-10-CM

## 2022-07-19 RX ORDER — SERTRALINE HYDROCHLORIDE 100 MG/1
TABLET, FILM COATED ORAL
Qty: 135 TABLET | Refills: 1 | Status: SHIPPED | OUTPATIENT
Start: 2022-07-19 | End: 2023-01-18

## 2022-07-19 NOTE — TELEPHONE ENCOUNTER
"Last Written Prescription Date:  1/31/22  Last Fill Quantity: 135,  # refills: 2   Last office visit provider:  12/22/21     Requested Prescriptions   Pending Prescriptions Disp Refills     sertraline (ZOLOFT) 100 MG tablet 135 tablet 2     Sig: TAKE 1 AND 1/2 TABLETS BY  MOUTH DAILY       SSRIs Protocol Passed - 7/19/2022  3:39 PM        Passed - Recent (12 mo) or future (30 days) visit within the authorizing provider's specialty     Patient has had an office visit with the authorizing provider or a provider within the authorizing providers department within the previous 12 mos or has a future within next 30 days. See \"Patient Info\" tab in inbasket, or \"Choose Columns\" in Meds & Orders section of the refill encounter.              Passed - Medication is active on med list        Passed - Patient is age 18 or older        Passed - No active pregnancy on record        Passed - No positive pregnancy test in last 12 months             Marilyn Thomas, RN 07/19/22 6:43 PM  "

## 2022-07-19 NOTE — TELEPHONE ENCOUNTER
Pended medication refill for patient with new pharmacy attached.    Routing to med refill pool.    Leanne Childers RN on 7/19/2022 at 3:39 PM

## 2022-08-24 DIAGNOSIS — E03.9 ACQUIRED HYPOTHYROIDISM: ICD-10-CM

## 2022-08-25 RX ORDER — LEVOTHYROXINE SODIUM 150 UG/1
TABLET ORAL
Qty: 90 TABLET | Refills: 1 | Status: SHIPPED | OUTPATIENT
Start: 2022-08-25 | End: 2023-02-27

## 2022-08-25 NOTE — TELEPHONE ENCOUNTER
"Last Written Prescription Date:  2/22/22  Last Fill Quantity: 90,  # refills: 1   Last office visit provider:  12/22/21     Requested Prescriptions   Pending Prescriptions Disp Refills     levothyroxine (SYNTHROID/LEVOTHROID) 150 MCG tablet [Pharmacy Med Name: LEVOTHYROXINE 150 MCG TABLET] 90 tablet 1     Sig: TAKE 1 TABLET BY MOUTH EVERY DAY       Thyroid Protocol Passed - 8/24/2022 12:28 AM        Passed - Patient is 12 years or older        Passed - Recent (12 mo) or future (30 days) visit within the authorizing provider's specialty     Patient has had an office visit with the authorizing provider or a provider within the authorizing providers department within the previous 12 mos or has a future within next 30 days. See \"Patient Info\" tab in inbasket, or \"Choose Columns\" in Meds & Orders section of the refill encounter.              Passed - Medication is active on med list        Passed - Normal TSH on file in past 12 months     Recent Labs   Lab Test 06/13/22  0718   TSH 2.32              Passed - No active pregnancy on record     If patient is pregnant or has had a positive pregnancy test, please check TSH.          Passed - No positive pregnancy test in past 12 months     If patient is pregnant or has had a positive pregnancy test, please check TSH.               Tianna Davis RN 08/25/22 12:41 PM  "

## 2022-09-24 ENCOUNTER — HEALTH MAINTENANCE LETTER (OUTPATIENT)
Age: 52
End: 2022-09-24

## 2022-10-03 ENCOUNTER — ANCILLARY PROCEDURE (OUTPATIENT)
Dept: MAMMOGRAPHY | Facility: CLINIC | Age: 52
End: 2022-10-03
Attending: FAMILY MEDICINE
Payer: COMMERCIAL

## 2022-10-03 DIAGNOSIS — Z12.31 VISIT FOR SCREENING MAMMOGRAM: ICD-10-CM

## 2022-10-03 PROCEDURE — 77067 SCR MAMMO BI INCL CAD: CPT

## 2023-01-17 DIAGNOSIS — F41.1 ANXIETY STATE: ICD-10-CM

## 2023-01-18 RX ORDER — SERTRALINE HYDROCHLORIDE 100 MG/1
TABLET, FILM COATED ORAL
Qty: 135 TABLET | Refills: 0 | Status: SHIPPED | OUTPATIENT
Start: 2023-01-18 | End: 2023-03-01

## 2023-01-18 NOTE — TELEPHONE ENCOUNTER
"Routing refill request to provider for review/approval because:  Patient needs to be seen because it has been more than 1 year since last office visit.    Last Written Prescription Date:  7/19/22  Last Fill Quantity: 135,  # refills: 1   Last office visit provider:  12/22/21     Requested Prescriptions   Pending Prescriptions Disp Refills     sertraline (ZOLOFT) 100 MG tablet [Pharmacy Med Name: SERTRALINE  MG TABLET] 135 tablet 1     Sig: TAKE 1 AND 1/2 TABLETS DAILY BY MOUTH       SSRIs Protocol Failed - 1/18/2023  1:18 PM        Failed - Recent (12 mo) or future (30 days) visit within the authorizing provider's specialty     Patient has had an office visit with the authorizing provider or a provider within the authorizing providers department within the previous 12 mos or has a future within next 30 days. See \"Patient Info\" tab in inbasket, or \"Choose Columns\" in Meds & Orders section of the refill encounter.              Failed - Medication is active on med list        Passed - Patient is age 18 or older        Passed - No active pregnancy on record        Passed - No positive pregnancy test in last 12 months             Roger Oneal RN 01/18/23 1:18 PM  "

## 2023-01-29 ENCOUNTER — HEALTH MAINTENANCE LETTER (OUTPATIENT)
Age: 53
End: 2023-01-29

## 2023-02-25 DIAGNOSIS — E03.9 ACQUIRED HYPOTHYROIDISM: ICD-10-CM

## 2023-02-27 ENCOUNTER — MYC REFILL (OUTPATIENT)
Dept: FAMILY MEDICINE | Facility: CLINIC | Age: 53
End: 2023-02-27
Payer: COMMERCIAL

## 2023-02-27 DIAGNOSIS — F41.1 ANXIETY STATE: ICD-10-CM

## 2023-02-27 RX ORDER — LEVOTHYROXINE SODIUM 150 UG/1
TABLET ORAL
Qty: 90 TABLET | Refills: 0 | Status: SHIPPED | OUTPATIENT
Start: 2023-02-27 | End: 2023-05-28

## 2023-02-27 NOTE — TELEPHONE ENCOUNTER
"Routing refill request to provider for review/approval because:  Patient needs to be seen because it has been more than 1 year since last office visit.    Last Written Prescription Date:  8/25/22  Last Fill Quantity: 90,  # refills: 1   Last office visit provider:  12/21/21     Requested Prescriptions   Pending Prescriptions Disp Refills     levothyroxine (SYNTHROID/LEVOTHROID) 150 MCG tablet [Pharmacy Med Name: LEVOTHYROXINE 150 MCG TABLET] 90 tablet 1     Sig: TAKE 1 TABLET BY MOUTH EVERY DAY       Thyroid Protocol Failed - 2/25/2023  9:16 AM        Failed - Recent (12 mo) or future (30 days) visit within the authorizing provider's specialty     Patient has had an office visit with the authorizing provider or a provider within the authorizing providers department within the previous 12 mos or has a future within next 30 days. See \"Patient Info\" tab in inbasket, or \"Choose Columns\" in Meds & Orders section of the refill encounter.              Passed - Patient is 12 years or older        Passed - Medication is active on med list        Passed - Normal TSH on file in past 12 months     Recent Labs   Lab Test 06/13/22  0718   TSH 2.32              Passed - No active pregnancy on record     If patient is pregnant or has had a positive pregnancy test, please check TSH.          Passed - No positive pregnancy test in past 12 months     If patient is pregnant or has had a positive pregnancy test, please check TSH.               Marilyn Thomas RN 02/26/23 8:06 PM  "

## 2023-02-28 RX ORDER — SERTRALINE HYDROCHLORIDE 100 MG/1
TABLET, FILM COATED ORAL
Qty: 135 TABLET | Refills: 0 | OUTPATIENT
Start: 2023-02-28

## 2023-03-01 ENCOUNTER — MYC REFILL (OUTPATIENT)
Dept: FAMILY MEDICINE | Facility: CLINIC | Age: 53
End: 2023-03-01
Payer: COMMERCIAL

## 2023-03-01 DIAGNOSIS — F41.1 ANXIETY STATE: ICD-10-CM

## 2023-03-02 RX ORDER — SERTRALINE HYDROCHLORIDE 100 MG/1
TABLET, FILM COATED ORAL
Qty: 135 TABLET | Refills: 0 | Status: SHIPPED | OUTPATIENT
Start: 2023-03-02 | End: 2023-06-03

## 2023-03-02 NOTE — TELEPHONE ENCOUNTER
"Routing refill request to provider for review/approval because:  Patient needs to be seen because it has been more than 1 year since last office visit.      Last Written Prescription Date:  1/18/2023  Last Fill Quantity: 135,  # refills: 0   Last office visit provider:  12/22/2021     Requested Prescriptions   Pending Prescriptions Disp Refills     sertraline (ZOLOFT) 100 MG tablet 135 tablet 0     Sig: TAKE 1 AND 1/2 TABLETS DAILY BY MOUTH       SSRIs Protocol Failed - 3/1/2023  8:59 AM        Failed - Recent (12 mo) or future (30 days) visit within the authorizing provider's specialty     Patient has had an office visit with the authorizing provider or a provider within the authorizing providers department within the previous 12 mos or has a future within next 30 days. See \"Patient Info\" tab in inbasket, or \"Choose Columns\" in Meds & Orders section of the refill encounter.              Passed - Medication is active on med list        Passed - Patient is age 18 or older        Passed - No active pregnancy on record        Passed - No positive pregnancy test in last 12 months             Cristy Armstrong RN 03/01/23 10:00 PM  "

## 2023-04-19 DIAGNOSIS — F51.01 PRIMARY INSOMNIA: ICD-10-CM

## 2023-04-19 RX ORDER — HYDROXYZINE PAMOATE 50 MG/1
CAPSULE ORAL
Qty: 90 CAPSULE | Refills: 3 | Status: SHIPPED | OUTPATIENT
Start: 2023-04-19 | End: 2023-11-29

## 2023-04-19 NOTE — TELEPHONE ENCOUNTER
Routing refill request to provider for review/approval because:  Patient needs to be seen because it has been more than 1 year since last office visit.    Pending Prescriptions:                       Disp   Refills    hydrOXYzine (VISTARIL) 50 MG capsule [Phar*90 cap*3        Sig: TAKE 1 CAPSULE (50 MG TOTAL) BY MOUTH AT BEDTIME AS           NEEDED (SLEEP).    Last Written Prescription Date:  2/14/22  Last Fill Quantity: 90,  # refills: 3   Last office visitwith prescribing provider: 12/22/2021   Future Office Visit: Overdue; will send mychart to schedule appt    JUVENAL MoralesN, RN  [facility]

## 2023-04-24 ENCOUNTER — OFFICE VISIT (OUTPATIENT)
Dept: FAMILY MEDICINE | Facility: CLINIC | Age: 53
End: 2023-04-24
Payer: COMMERCIAL

## 2023-04-24 ENCOUNTER — ANCILLARY PROCEDURE (OUTPATIENT)
Dept: GENERAL RADIOLOGY | Facility: CLINIC | Age: 53
End: 2023-04-24
Attending: FAMILY MEDICINE
Payer: COMMERCIAL

## 2023-04-24 VITALS
BODY MASS INDEX: 26.61 KG/M2 | TEMPERATURE: 97 F | HEART RATE: 79 BPM | OXYGEN SATURATION: 98 % | DIASTOLIC BLOOD PRESSURE: 98 MMHG | WEIGHT: 144.6 LBS | HEIGHT: 62 IN | RESPIRATION RATE: 18 BRPM | SYSTOLIC BLOOD PRESSURE: 148 MMHG

## 2023-04-24 DIAGNOSIS — R05.1 ACUTE COUGH: ICD-10-CM

## 2023-04-24 DIAGNOSIS — J20.9 ACUTE BRONCHITIS, UNSPECIFIED ORGANISM: Primary | ICD-10-CM

## 2023-04-24 DIAGNOSIS — R07.81 PLEURITIC CHEST PAIN: ICD-10-CM

## 2023-04-24 PROCEDURE — 99214 OFFICE O/P EST MOD 30 MIN: CPT | Performed by: FAMILY MEDICINE

## 2023-04-24 PROCEDURE — 71046 X-RAY EXAM CHEST 2 VIEWS: CPT | Mod: TC | Performed by: RADIOLOGY

## 2023-04-24 RX ORDER — PREDNISONE 20 MG/1
40 TABLET ORAL DAILY
Qty: 10 TABLET | Refills: 0 | Status: SHIPPED | OUTPATIENT
Start: 2023-04-24 | End: 2023-04-28

## 2023-04-24 RX ORDER — AZITHROMYCIN 250 MG/1
TABLET, FILM COATED ORAL
Qty: 6 TABLET | Refills: 0 | Status: SHIPPED | OUTPATIENT
Start: 2023-04-24 | End: 2023-04-29

## 2023-04-24 RX ORDER — BENZONATATE 100 MG/1
100 CAPSULE ORAL 3 TIMES DAILY PRN
Qty: 30 CAPSULE | Refills: 0 | Status: SHIPPED | OUTPATIENT
Start: 2023-04-24 | End: 2023-09-07

## 2023-04-24 ASSESSMENT — PATIENT HEALTH QUESTIONNAIRE - PHQ9
SUM OF ALL RESPONSES TO PHQ QUESTIONS 1-9: 5
10. IF YOU CHECKED OFF ANY PROBLEMS, HOW DIFFICULT HAVE THESE PROBLEMS MADE IT FOR YOU TO DO YOUR WORK, TAKE CARE OF THINGS AT HOME, OR GET ALONG WITH OTHER PEOPLE: SOMEWHAT DIFFICULT
SUM OF ALL RESPONSES TO PHQ QUESTIONS 1-9: 5

## 2023-04-24 ASSESSMENT — PAIN SCALES - GENERAL: PAINLEVEL: MODERATE PAIN (4)

## 2023-04-24 NOTE — PROGRESS NOTES
"  Assessment & Plan     Acute bronchitis, unspecified organism    - azithromycin (ZITHROMAX) 250 MG tablet  Dispense: 6 tablet; Refill: 0  - predniSONE (DELTASONE) 20 MG tablet  Dispense: 10 tablet; Refill: 0  - benzonatate (TESSALON) 100 MG capsule  Dispense: 30 capsule; Refill: 0    Acute cough    - XR Chest 2 Views  - predniSONE (DELTASONE) 20 MG tablet  Dispense: 10 tablet; Refill: 0  - benzonatate (TESSALON) 100 MG capsule  Dispense: 30 capsule; Refill: 0    Pleuritic chest pain    - XR Chest 2 Views    Chest x-ray is negative.  Given duration of symptoms, will treat with azithromycin.  Counseled on use of medication.  Also prescribed prednisone 40 mg daily for 5 days and been to take capsules.  Encouraged rest and adequate fluid intake.  Follow-up if symptoms worsen, do not improve or new concerns develop.           BMI:   Estimated body mass index is 26.44 kg/m  as calculated from the following:    Height as of this encounter: 1.575 m (5' 2.01\").    Weight as of this encounter: 65.6 kg (144 lb 9.6 oz).           Irma Lerner MD  [facility] 52 year [facility], presenting for the following health issues:  Cough (Cough for 1 month)        4/24/2023    11:23 AM   Additional Questions   Roomed by Heidi Christiansen   Accompanied by self     HPI   She comes in today complaining of cough for 1 month.  Was seen at [facility] clinic about a week and half ago.  Diagnosed with viral infection.  Advised to continue symptomatic cares.  Comes in today because she has been experiencing right-sided chest pain for the past 3 days.  Has discomfort with coughing and taking a deep breath.  Discomfort is located underneath the right breast going around towards her back.  Cough is dry.  She has not had any shortness of breath or wheezing.  No sore throat but throat has felt a little bit irritated.  Has had slight headache and pressure in between her eyes.  Has not had fevers or chills.  She " "has been using Mucinex and Advil.  No sick contacts.  Review of systems otherwise negative.  No other concerns          Review of Systems         Objective    BP (!) 148/98   Pulse 79   Temp 97  F (36.1  C) (Temporal)   Resp 18   Ht 1.575 m (5' 2.01\")   Wt 65.6 kg (144 lb 9.6 oz)   SpO2 98%   BMI 26.44 kg/m    Body mass index is 26.44 kg/m .  Physical Exam   GENERAL: healthy, alert and no distress  NECK: no adenopathy, no asymmetry, masses, or scars and thyroid normal to palpation  RESP: lungs clear to auscultation - no rales, rhonchi or wheezes, coughs intermittently   CV: regular rate and rhythm, normal S1 S2, no S3 or S4, no murmur, click or rub, no peripheral edema and peripheral pulses strong  PSYCH: mentation appears normal, affect normal/bright    CXR - Reviewed and interpreted by me Normal- no infiltrates, effusions, pneumothoraces, cardiomegaly or masses                "

## 2023-04-28 ENCOUNTER — OFFICE VISIT (OUTPATIENT)
Dept: FAMILY MEDICINE | Facility: CLINIC | Age: 53
End: 2023-04-28
Payer: COMMERCIAL

## 2023-04-28 VITALS
TEMPERATURE: 96.7 F | RESPIRATION RATE: 20 BRPM | WEIGHT: 144.2 LBS | SYSTOLIC BLOOD PRESSURE: 128 MMHG | BODY MASS INDEX: 26.54 KG/M2 | HEART RATE: 79 BPM | OXYGEN SATURATION: 98 % | DIASTOLIC BLOOD PRESSURE: 68 MMHG | HEIGHT: 62 IN

## 2023-04-28 DIAGNOSIS — F32.5 MAJOR DEPRESSION IN REMISSION (H): ICD-10-CM

## 2023-04-28 DIAGNOSIS — F51.01 PRIMARY INSOMNIA: ICD-10-CM

## 2023-04-28 DIAGNOSIS — J20.9 ACUTE BRONCHITIS, UNSPECIFIED ORGANISM: Primary | ICD-10-CM

## 2023-04-28 PROCEDURE — 99213 OFFICE O/P EST LOW 20 MIN: CPT | Performed by: FAMILY MEDICINE

## 2023-04-28 RX ORDER — ZOLPIDEM TARTRATE 5 MG/1
TABLET ORAL
Qty: 10 TABLET | Refills: 0 | Status: SHIPPED | OUTPATIENT
Start: 2023-04-28 | End: 2023-07-19

## 2023-04-28 RX ORDER — CODEINE PHOSPHATE AND GUAIFENESIN 10; 100 MG/5ML; MG/5ML
1-2 SOLUTION ORAL EVERY 6 HOURS PRN
Qty: 200 ML | Refills: 0 | Status: SHIPPED | OUTPATIENT
Start: 2023-04-28 | End: 2023-09-07

## 2023-04-28 ASSESSMENT — PAIN SCALES - GENERAL: PAINLEVEL: SEVERE PAIN (7)

## 2023-04-28 NOTE — PROGRESS NOTES
"  Assessment & Plan     Acute bronchitis, unspecified organism  Lung exam is clear.  Recent chest x-ray on Monday was clear.  She completed a Z-Demetrius and is completing a 5-day course of prednisone.  We will treat with Augmentin to provide better coverage for possible community-acquired pneumonia.  Will start steroid inhaler.  Directed her to use this for at least 2 weeks and then taper off if cough is improving.  We will also provide prescription for guaifenesin with codeine cough syrup.  She is to notify me early next week if she is not noticing any improvement in symptoms  - amoxicillin-clavulanate (AUGMENTIN) 875-125 MG tablet  Dispense: 20 tablet; Refill: 0  - beclomethasone HFA (QVAR REDIHALER) 40 MCG/ACT inhaler  Dispense: 10.6 g; Refill: 1  - guaiFENesin-codeine (ROBITUSSIN AC) 100-10 MG/5ML solution  Dispense: 200 mL; Refill: 0    Primary insomnia  She requests refill of this medication.  This is provided  - zolpidem (AMBIEN) 5 MG tablet  Dispense: 10 tablet; Refill: 0    Major depression in remission (H)  Doing well on current dose of sertraline and bupropion               BMI:   Estimated body mass index is 26.37 kg/m  as calculated from the following:    Height as of this encounter: 1.575 m (5' 2\").    Weight as of this encounter: 65.4 kg (144 lb 3.2 oz).           Irma Lerner MD  [facility] is a 52 year old, presenting for the following health issues:  Follow Up (Cough not improving. Follow up visit from 4-24-23)        4/24/2023    11:23 AM   Additional Questions   Roomed by Heidi Christiansen   Accompanied by self     HPI   She comes in today to follow-up on a cough.  Was seen on Monday of this week.  Chest x-ray performed at that time and was negative.  She was prescribed a Z-Demetrius, 5 days of prednisone and Tessalon Perles.  She reports that there has been no improvement in her symptoms.  Completed the Z-Demetrius and the prednisone today.  Tessalon Perles have not " "been helpful.  Still with frequent coughing.  Has discomfort in both sides of her chest with coughing.  Not getting much sleep at night.  Has had a little bit more of a runny nose.  Has a little bit of a sinus headache.  Otherwise no new symptoms of concern.  Review of systems is otherwise negative.  Medications and allergies are reviewed and updated.          Review of Systems         Objective    /68 (BP Location: Right arm, Patient Position: Sitting, Cuff Size: Adult Regular)   Pulse 79   Temp (!) 96.7  F (35.9  C) (Temporal)   Resp 20   Ht 1.575 m (5' 2\")   Wt 65.4 kg (144 lb 3.2 oz)   SpO2 98%   BMI 26.37 kg/m    Body mass index is 26.37 kg/m .  Physical Exam   GENERAL: healthy, alert and no distress  EYES: Eyes grossly normal to inspection, PERRL and conjunctivae and sclerae normal  RESP: lungs clear to auscultation - no rales, rhonchi or wheezes  CV: regular rate and rhythm, normal S1 S2, no S3 or S4, no murmur, click or rub, no peripheral edema and peripheral pulses strong  PSYCH: mentation appears normal, affect normal/bright                    "

## 2023-05-03 DIAGNOSIS — F32.5 MAJOR DEPRESSION IN REMISSION (H): ICD-10-CM

## 2023-05-04 RX ORDER — BUPROPION HYDROCHLORIDE 300 MG/1
TABLET ORAL
Qty: 90 TABLET | Refills: 3 | Status: SHIPPED | OUTPATIENT
Start: 2023-05-04 | End: 2023-11-29

## 2023-05-04 NOTE — TELEPHONE ENCOUNTER
"Routing refill request to provider for review/approval because:  phq 9    Last Written Prescription Date:  2/2/23  Last Fill Quantity: 90,  # refills: 0   Last office visit provider:  4/28/23     Requested Prescriptions   Pending Prescriptions Disp Refills     buPROPion (WELLBUTRIN XL) 300 MG 24 hr tablet [Pharmacy Med Name: BUPROPION HCL  MG TABLET] 90 tablet 0     Sig: TAKE 1 TABLET BY MOUTH EVERY MORNING       SSRIs Protocol Failed - 5/3/2023 12:57 AM        Failed - PHQ-9 score less than 5 in past 6 months     Please review last PHQ-9 score.           Passed - Medication is Bupropion     If the medication is Bupropion (Wellbutrin), and the patient is taking for smoking cessation; OK to refill.          Passed - Medication is active on med list        Passed - Patient is age 18 or older        Passed - No active pregnancy on record        Passed - No positive pregnancy test in last 12 months        Passed - Recent (6 mo) or future (30 days) visit within the authorizing provider's specialty     Patient had office visit in the last 6 months or has a visit in the next 30 days with authorizing provider or within the authorizing provider's specialty.  See \"Patient Info\" tab in inbasket, or \"Choose Columns\" in Meds & Orders section of the refill encounter.                 Marilyn Thomas RN 05/03/23 7:13 PM  "

## 2023-05-27 DIAGNOSIS — E03.9 ACQUIRED HYPOTHYROIDISM: ICD-10-CM

## 2023-05-28 RX ORDER — LEVOTHYROXINE SODIUM 150 UG/1
150 TABLET ORAL DAILY
Qty: 90 TABLET | Refills: 0 | Status: SHIPPED | OUTPATIENT
Start: 2023-05-28 | End: 2023-09-04

## 2023-05-28 NOTE — TELEPHONE ENCOUNTER
"Last Written Prescription Date:  2/27/23  Last Fill Quantity: 90,  # refills: 0   Last office visit provider:  4/28/23     Requested Prescriptions   Pending Prescriptions Disp Refills     levothyroxine (SYNTHROID/LEVOTHROID) 150 MCG tablet [Pharmacy Med Name: LEVOTHYROXINE 150 MCG TABLET] 90 tablet 0     Sig: TAKE 1 TABLET BY MOUTH EVERY DAY       Thyroid Protocol Passed - 5/28/2023 11:09 AM        Passed - Patient is 12 years or older        Passed - Recent (12 mo) or future (30 days) visit within the authorizing provider's specialty     Patient has had an office visit with the authorizing provider or a provider within the authorizing providers department within the previous 12 mos or has a future within next 30 days. See \"Patient Info\" tab in inbasket, or \"Choose Columns\" in Meds & Orders section of the refill encounter.              Passed - Medication is active on med list        Passed - Normal TSH on file in past 12 months     Recent Labs   Lab Test 06/13/22  0718   TSH 2.32              Passed - No active pregnancy on record     If patient is pregnant or has had a positive pregnancy test, please check TSH.          Passed - No positive pregnancy test in past 12 months     If patient is pregnant or has had a positive pregnancy test, please check TSH.               Roger Oneal RN 05/28/23 11:09 AM  " Suturegard Body: The suture ends were repeatedly re-tightened and re-clamped to achieve the desired tissue expansion.

## 2023-06-03 ENCOUNTER — MYC REFILL (OUTPATIENT)
Dept: FAMILY MEDICINE | Facility: CLINIC | Age: 53
End: 2023-06-03
Payer: COMMERCIAL

## 2023-06-03 DIAGNOSIS — F41.1 ANXIETY STATE: ICD-10-CM

## 2023-06-03 RX ORDER — SERTRALINE HYDROCHLORIDE 100 MG/1
TABLET, FILM COATED ORAL
Qty: 135 TABLET | Refills: 2 | Status: SHIPPED | OUTPATIENT
Start: 2023-06-03 | End: 2023-11-29

## 2023-06-04 NOTE — TELEPHONE ENCOUNTER
"Last Written Prescription Date: 3/2/23  Last Fill Quantity: 135,  # refills: 0   Last office visit provider:  4/28/23     Requested Prescriptions   Pending Prescriptions Disp Refills     sertraline (ZOLOFT) 100 MG tablet 135 tablet 0     Sig: TAKE 1 AND 1/2 TABLETS DAILY BY MOUTH       SSRIs Protocol Passed - 6/3/2023  9:19 AM        Passed - Recent (12 mo) or future (30 days) visit within the authorizing provider's specialty     Patient has had an office visit with the authorizing provider or a provider within the authorizing providers department within the previous 12 mos or has a future within next 30 days. See \"Patient Info\" tab in inbasket, or \"Choose Columns\" in Meds & Orders section of the refill encounter.              Passed - Medication is active on med list        Passed - Patient is age 18 or older        Passed - No active pregnancy on record        Passed - No positive pregnancy test in last 12 months             TARSHA BATES RN 06/03/23 9:52 PM  "

## 2023-07-19 ENCOUNTER — MYC MEDICAL ADVICE (OUTPATIENT)
Dept: OBGYN | Facility: CLINIC | Age: 53
End: 2023-07-19
Payer: COMMERCIAL

## 2023-07-19 DIAGNOSIS — F51.01 PRIMARY INSOMNIA: ICD-10-CM

## 2023-07-19 RX ORDER — ZOLPIDEM TARTRATE 5 MG/1
TABLET ORAL
Qty: 10 TABLET | Refills: 0 | Status: SHIPPED | OUTPATIENT
Start: 2023-07-19

## 2023-07-19 NOTE — TELEPHONE ENCOUNTER
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  OV with us last in 01/2022  Routing to provider to advise.  Zoey Mg RN on 7/19/2023 at 9:48 AM

## 2023-09-04 DIAGNOSIS — E03.9 ACQUIRED HYPOTHYROIDISM: ICD-10-CM

## 2023-09-04 RX ORDER — LEVOTHYROXINE SODIUM 150 UG/1
150 TABLET ORAL DAILY
Qty: 90 TABLET | Refills: 0 | Status: SHIPPED | OUTPATIENT
Start: 2023-09-04 | End: 2023-09-13 | Stop reason: DRUGHIGH

## 2023-09-04 NOTE — TELEPHONE ENCOUNTER
"Routing refill request to provider for review/approval because:  Labs not current:  TSH    Last Written Prescription Date:  5/28/23  Last Fill Quantity: 90,  # refills: 0   Last office visit provider:   4/28/23    Requested Prescriptions   Pending Prescriptions Disp Refills    levothyroxine (SYNTHROID/LEVOTHROID) 150 MCG tablet [Pharmacy Med Name: LEVOTHYROXINE 150 MCG TABLET] 90 tablet 0     Sig: TAKE 1 TABLET BY MOUTH EVERY DAY       Thyroid Protocol Failed - 9/4/2023 12:58 AM        Failed - Normal TSH on file in past 12 months     Recent Labs   Lab Test 06/13/22  0718   TSH 2.32              Passed - Patient is 12 years or older        Passed - Recent (12 mo) or future (30 days) visit within the authorizing provider's specialty     Patient has had an office visit with the authorizing provider or a provider within the authorizing providers department within the previous 12 mos or has a future within next 30 days. See \"Patient Info\" tab in inbasket, or \"Choose Columns\" in Meds & Orders section of the refill encounter.              Passed - Medication is active on med list        Passed - No active pregnancy on record     If patient is pregnant or has had a positive pregnancy test, please check TSH.          Passed - No positive pregnancy test in past 12 months     If patient is pregnant or has had a positive pregnancy test, please check TSH.               Naila Perez RN 09/04/23 3:56 AM  "

## 2023-09-05 ENCOUNTER — PATIENT OUTREACH (OUTPATIENT)
Dept: CARE COORDINATION | Facility: CLINIC | Age: 53
End: 2023-09-05
Payer: COMMERCIAL

## 2023-09-07 ENCOUNTER — VIRTUAL VISIT (OUTPATIENT)
Dept: FAMILY MEDICINE | Facility: CLINIC | Age: 53
End: 2023-09-07
Payer: COMMERCIAL

## 2023-09-07 DIAGNOSIS — E03.9 ACQUIRED HYPOTHYROIDISM: Primary | ICD-10-CM

## 2023-09-07 DIAGNOSIS — R53.83 FATIGUE, UNSPECIFIED TYPE: ICD-10-CM

## 2023-09-07 DIAGNOSIS — F32.5 MAJOR DEPRESSION IN REMISSION (H): ICD-10-CM

## 2023-09-07 PROCEDURE — 99214 OFFICE O/P EST MOD 30 MIN: CPT | Mod: VID | Performed by: FAMILY MEDICINE

## 2023-09-07 NOTE — PROGRESS NOTES
"Gloria is a 52 year old who is being evaluated via a billable video visit.      How would you like to obtain your AVS? MyChart  If the video visit is dropped, the invitation should be resent by: Text to cell phone: [phone]  Will anyone else be joining your video visit? No          Assessment & Plan     (E03.9) Acquired hypothyroidism  (primary encounter diagnosis)  Comment: pt has hypothyroidism for some years. She takes medication as instructed. Reviewed the chart: last tsh was 6/2022 and has been stable. Pt has been feeling fatigue with weight gain for several month. .   Plan: TSH with free T4 reflex        Will follow-up lab to adjust the medication accordingly. Advise to monitor tsh at least once a year if thyroid function is normal and stable     (R53.83) Fatigue, unspecified type  Comment: pt has been felt fatigue for some time. Sleep ok.   Plan: CBC with platelets, Vitamin D deficiency         screening, Comprehensive metabolic panel (BMP +        Alb, Alk Phos, ALT, AST, Total. Bili, TP)        We discussed multiply reasons cause fatigue such as hypothyroidism, anemia, depression, inosemia, stress, sienna, lack of exercise, menopause etc. Will follow-up lab. Advise regular exercise.      (F32.5) Major depression in remission (H)  Comment: doing ok with medication and no side effect.   Plan: will continue current medication.           BMI:   Estimated body mass index is 26.37 kg/m  as calculated from the following:    Height as of 4/28/23: 1.575 m (5' 2\").    Weight as of 4/28/23: 65.4 kg (144 lb 3.2 oz).       See Patient Instructions    Rosalie Hodges MD  [facility]   Gloria is a 52 year old, presenting for the following health issues:  Medication Update (Extreme fatigue, brain fog, weight gain - feeling as if thyroid is off )        4/24/2023    11:23 AM   Additional Questions   Roomed by Heidi Christiansen   Accompanied by self       History of Present Illness "       Hypothyroidism:     Since last visit, patient describes the following symptoms::  Anxiety, Depression, Fatigue and Weight gain    Weight gain::  6-10 lbs.    She eats 2-3 servings of fruits and vegetables daily.She consumes 0 sweetened beverage(s) daily.She exercises with enough effort to increase her heart rate 10 to 19 minutes per day.  She exercises with enough effort to increase her heart rate 3 or less days per week.   She is taking medications regularly.       Depression Followup  How are you doing with your depression since your last visit? No change  Are you having other symptoms that might be associated with depression? No  Have you had a significant life event?  No   Are you feeling anxious or having panic attacks?   No  Do you have any concerns with your use of alcohol or other drugs? No    Social History     Tobacco Use    Smoking status: Never    Smokeless tobacco: Never   Substance Use Topics    Alcohol use: Yes     Alcohol/week: 4.0 standard drinks of alcohol     Comment: Alcoholic Drinks/day: occ    Drug use: No         9/10/2021    10:04 AM 12/22/2021    10:00 AM 4/24/2023     7:10 AM   PHQ   PHQ-9 Total Score 12 5 5   Q9: Thoughts of better off dead/self-harm past 2 weeks Not at all Not at all Not at all         1/30/2020     2:00 PM 9/10/2021    10:05 AM 12/22/2021    10:00 AM   GIOVANNI-7 SCORE   Total Score  6 (mild anxiety) 4 (minimal anxiety)   Total Score 10 6 4         4/24/2023     7:10 AM   Last PHQ-9   1.  Little interest or pleasure in doing things 1   2.  Feeling down, depressed, or hopeless 1   3.  Trouble falling or staying asleep, or sleeping too much 1   4.  Feeling tired or having little energy 1   5.  Poor appetite or overeating 0   6.  Feeling bad about yourself 0   7.  Trouble concentrating 1   8.  Moving slowly or restless 0   Q9: Thoughts of better off dead/self-harm past 2 weeks 0   PHQ-9 Total Score 5         12/22/2021    10:00 AM   GIOVANNI-7    1. Feeling nervous, anxious, or  on edge 1   2. Not being able to stop or control worrying 0   3. Worrying too much about different things 1   4. Trouble relaxing 1   5. Being so restless that it is hard to sit still 0   6. Becoming easily annoyed or irritable 1   7. Feeling afraid, as if something awful might happen 0   GIOVANNI-7 Total Score 4       Suicide Assessment Five-step Evaluation and Treatment (SAFE-T)    Hypothyroidism Follow-up    Since last visit, patient describes the following symptoms: Weight gain, no hair loss, no skin changes, no constipation, no loose stools        Review of Systems   Constitutional, HEENT, cardiovascular, pulmonary, gi and gu systems are negative, except as otherwise noted.      Objective    Vitals - Patient Reported  Systolic (Patient Reported): 128  Diastolic (Patient Reported): 80        Physical Exam   GENERAL: Healthy, alert and no distress  EYES: Eyes grossly normal to inspection.  No discharge or erythema, or obvious scleral/conjunctival abnormalities.  RESP: No audible wheeze, cough, or visible cyanosis.  No visible retractions or increased work of breathing.    SKIN: Visible skin clear. No significant rash, abnormal pigmentation or lesions.  NEURO: Cranial nerves grossly intact.  Mentation and speech appropriate for age.  PSYCH: Mentation appears normal, affect normal/bright, judgement and insight intact, normal speech and appearance well-groomed.                Video-Visit Details    Type of service:  Video Visit     Originating Location (pt. Location): Home    Distant Location (provider location):  On-site  Platform used for Video Visit: [employer]

## 2023-09-08 ENCOUNTER — MYC MEDICAL ADVICE (OUTPATIENT)
Dept: FAMILY MEDICINE | Facility: CLINIC | Age: 53
End: 2023-09-08

## 2023-09-08 ENCOUNTER — LAB (OUTPATIENT)
Dept: LAB | Facility: CLINIC | Age: 53
End: 2023-09-08
Payer: COMMERCIAL

## 2023-09-08 DIAGNOSIS — R53.83 FATIGUE, UNSPECIFIED TYPE: ICD-10-CM

## 2023-09-08 DIAGNOSIS — E03.9 ACQUIRED HYPOTHYROIDISM: ICD-10-CM

## 2023-09-08 LAB
ALBUMIN SERPL BCG-MCNC: 4 G/DL (ref 3.5–5.2)
ALP SERPL-CCNC: 71 U/L (ref 35–104)
ALT SERPL W P-5'-P-CCNC: 19 U/L (ref 0–50)
ANION GAP SERPL CALCULATED.3IONS-SCNC: 10 MMOL/L (ref 7–15)
AST SERPL W P-5'-P-CCNC: 32 U/L (ref 0–45)
BILIRUB SERPL-MCNC: 0.2 MG/DL
BUN SERPL-MCNC: 10.9 MG/DL (ref 6–20)
CALCIUM SERPL-MCNC: 9.7 MG/DL (ref 8.6–10)
CHLORIDE SERPL-SCNC: 105 MMOL/L (ref 98–107)
CREAT SERPL-MCNC: 0.97 MG/DL (ref 0.51–0.95)
DEPRECATED CALCIDIOL+CALCIFEROL SERPL-MC: 76 UG/L (ref 20–75)
DEPRECATED HCO3 PLAS-SCNC: 26 MMOL/L (ref 22–29)
EGFRCR SERPLBLD CKD-EPI 2021: 71 ML/MIN/1.73M2
ERYTHROCYTE [DISTWIDTH] IN BLOOD BY AUTOMATED COUNT: 12.2 % (ref 10–15)
GLUCOSE SERPL-MCNC: 82 MG/DL (ref 70–99)
HCT VFR BLD AUTO: 39.9 % (ref 35–47)
HGB BLD-MCNC: 13.4 G/DL (ref 11.7–15.7)
MCH RBC QN AUTO: 31.1 PG (ref 26.5–33)
MCHC RBC AUTO-ENTMCNC: 33.6 G/DL (ref 31.5–36.5)
MCV RBC AUTO: 93 FL (ref 78–100)
PLATELET # BLD AUTO: 286 10E3/UL (ref 150–450)
POTASSIUM SERPL-SCNC: 4.3 MMOL/L (ref 3.4–5.3)
PROT SERPL-MCNC: 7 G/DL (ref 6.4–8.3)
RBC # BLD AUTO: 4.31 10E6/UL (ref 3.8–5.2)
SODIUM SERPL-SCNC: 141 MMOL/L (ref 136–145)
TSH SERPL DL<=0.005 MIU/L-ACNC: 3.16 UIU/ML (ref 0.3–4.2)
WBC # BLD AUTO: 8.3 10E3/UL (ref 4–11)

## 2023-09-08 PROCEDURE — 36415 COLL VENOUS BLD VENIPUNCTURE: CPT

## 2023-09-08 PROCEDURE — 85027 COMPLETE CBC AUTOMATED: CPT

## 2023-09-08 PROCEDURE — 84443 ASSAY THYROID STIM HORMONE: CPT

## 2023-09-08 PROCEDURE — 80053 COMPREHEN METABOLIC PANEL: CPT

## 2023-09-08 PROCEDURE — 82306 VITAMIN D 25 HYDROXY: CPT

## 2023-09-10 ENCOUNTER — MYC MEDICAL ADVICE (OUTPATIENT)
Dept: FAMILY MEDICINE | Facility: CLINIC | Age: 53
End: 2023-09-10
Payer: COMMERCIAL

## 2023-09-11 NOTE — TELEPHONE ENCOUNTER
9/8/23 TSH with Free T4 reflex ordered.   No free T4 result due to normal TSH.        Please view pt's question on lab orders and results.

## 2023-09-13 ENCOUNTER — OFFICE VISIT (OUTPATIENT)
Dept: FAMILY MEDICINE | Facility: CLINIC | Age: 53
End: 2023-09-13
Payer: COMMERCIAL

## 2023-09-13 VITALS
BODY MASS INDEX: 26.35 KG/M2 | OXYGEN SATURATION: 96 % | WEIGHT: 143.2 LBS | RESPIRATION RATE: 16 BRPM | DIASTOLIC BLOOD PRESSURE: 85 MMHG | HEART RATE: 101 BPM | HEIGHT: 62 IN | SYSTOLIC BLOOD PRESSURE: 140 MMHG

## 2023-09-13 DIAGNOSIS — E03.9 ACQUIRED HYPOTHYROIDISM: ICD-10-CM

## 2023-09-13 DIAGNOSIS — F32.5 MAJOR DEPRESSION IN REMISSION (H): ICD-10-CM

## 2023-09-13 DIAGNOSIS — F41.1 ANXIETY STATE: ICD-10-CM

## 2023-09-13 DIAGNOSIS — R03.0 ELEVATED BLOOD PRESSURE READING WITHOUT DIAGNOSIS OF HYPERTENSION: ICD-10-CM

## 2023-09-13 DIAGNOSIS — G47.00 INSOMNIA, UNSPECIFIED TYPE: ICD-10-CM

## 2023-09-13 DIAGNOSIS — E66.3 OVERWEIGHT WITH BODY MASS INDEX (BMI) OF 26 TO 26.9 IN ADULT: ICD-10-CM

## 2023-09-13 DIAGNOSIS — Z12.31 VISIT FOR SCREENING MAMMOGRAM: Primary | ICD-10-CM

## 2023-09-13 PROCEDURE — 99214 OFFICE O/P EST MOD 30 MIN: CPT | Performed by: FAMILY MEDICINE

## 2023-09-13 RX ORDER — LEVOTHYROXINE SODIUM 175 UG/1
175 TABLET ORAL DAILY
Qty: 30 TABLET | Refills: 1 | Status: SHIPPED | OUTPATIENT
Start: 2023-09-13 | End: 2023-11-10

## 2023-09-13 ASSESSMENT — ANXIETY QUESTIONNAIRES
2. NOT BEING ABLE TO STOP OR CONTROL WORRYING: SEVERAL DAYS
GAD7 TOTAL SCORE: 5
3. WORRYING TOO MUCH ABOUT DIFFERENT THINGS: SEVERAL DAYS
6. BECOMING EASILY ANNOYED OR IRRITABLE: SEVERAL DAYS
GAD7 TOTAL SCORE: 5
7. FEELING AFRAID AS IF SOMETHING AWFUL MIGHT HAPPEN: NOT AT ALL
1. FEELING NERVOUS, ANXIOUS, OR ON EDGE: SEVERAL DAYS
5. BEING SO RESTLESS THAT IT IS HARD TO SIT STILL: SEVERAL DAYS
IF YOU CHECKED OFF ANY PROBLEMS ON THIS QUESTIONNAIRE, HOW DIFFICULT HAVE THESE PROBLEMS MADE IT FOR YOU TO DO YOUR WORK, TAKE CARE OF THINGS AT HOME, OR GET ALONG WITH OTHER PEOPLE: SOMEWHAT DIFFICULT

## 2023-09-13 ASSESSMENT — PATIENT HEALTH QUESTIONNAIRE - PHQ9
5. POOR APPETITE OR OVEREATING: NOT AT ALL
SUM OF ALL RESPONSES TO PHQ QUESTIONS 1-9: 8

## 2023-09-13 NOTE — ASSESSMENT & PLAN NOTE
Elevated blood pressure without diagnosis of hypertension noted today.  It does look like she has had some high blood pressures in the past however she is meeting for me for the first time today and has anxiety.  At this time would like to continue to monitor and if her blood pressures continue to be elevated we should consider medication management.  In addition today I will give her monitoring parameters.    HTN  Patient is asked to monitor BP at home or work, several times per month and return with written values at next office visit. Goal bp is 120/80. If staying higher than 130/85 on three occasions you should bring the values into clinic so that we can evaluate and treat if needed.    Recommend stop alcohol, caffiene, ibuprofen, carbonated drinks, sudafed & decrease salt intake. If you smoke, it is recommended that you quit. Keep weight in normal range.     We agreed to have a nurse visit in 1-2 months and she will bring home bp readings at that time. If staying up over 130/80 consistently recommend initiate cozaar 25mg daily and follow up one month after med initiation.

## 2023-09-13 NOTE — ASSESSMENT & PLAN NOTE
For thyroidism-currently on levothyroxine 150 mcg tablet.  Continue this as her most recent TSH was normal just a few days ago.    However, the patient reports she wants to see if she will feel better at levothyroxine 175mcg dose and see if the tsh will still remain in range with that dose because she knows her body and thinks this might be the case.     Recommend trial levothyroxine 175mcg and recheck tsh in 4-6 weeks.

## 2023-09-13 NOTE — ASSESSMENT & PLAN NOTE
Insomnia noted in the setting of depression and anxiety.  The patient takes Wellbutrin 3 mg 24-hour tablet and Zoloft 150 mg p.o. daily.  In addition it appears that she was prescribed 10 tablets of Ambien 5 mg on 7/19/2023.

## 2023-09-13 NOTE — ASSESSMENT & PLAN NOTE
Anxiety and depression treated currently with Wellbutrin  mg 24-hour tablet and Zoloft 150 mg p.o. daily.  PHQ-9 is still a bit high we discussed increasing the Zoloft.

## 2023-09-13 NOTE — PROGRESS NOTES
Assessment & Plan   Problem List Items Addressed This Visit          Endocrine    Hypothyroidism     For thyroidism-currently on levothyroxine 150 mcg tablet.  Continue this as her most recent TSH was normal just a few days ago.    However, the patient reports she wants to see if she will feel better at levothyroxine 175mcg dose and see if the tsh will still remain in range with that dose because she knows her body and thinks this might be the case.     Recommend trial levothyroxine 175mcg and recheck tsh in 4-6 weeks.          Relevant Medications    levothyroxine (SYNTHROID/LEVOTHROID) 175 MCG tablet       Behavioral    Major depression in remission (H)     Anxiety and depression treated currently with Wellbutrin  mg 24-hour tablet and Zoloft 150 mg p.o. daily.  PHQ-9 is still a bit high we discussed increasing the Zoloft.            Other    Anxiety state     Anxiety and depression treated currently with Wellbutrin  mg 24-hour tablet and Zoloft 150 mg p.o. daily.  PHQ-9 is still a bit high we discussed increasing the Zoloft.         Elevated blood pressure reading without diagnosis of hypertension     Elevated blood pressure without diagnosis of hypertension noted today.  It does look like she has had some high blood pressures in the past however she is meeting for me for the first time today and has anxiety.  At this time would like to continue to monitor and if her blood pressures continue to be elevated we should consider medication management.  In addition today I will give her monitoring parameters.    HTN  Patient is asked to monitor BP at home or work, several times per month and return with written values at next office visit. Goal bp is 120/80. If staying higher than 130/85 on three occasions you should bring the values into clinic so that we can evaluate and treat if needed.    Recommend stop alcohol, caffiene, ibuprofen, carbonated drinks, sudafed & decrease salt intake. If you smoke, it  "is recommended that you quit. Keep weight in normal range.     We agreed to have a nurse visit in 1-2 months and she will bring home bp readings at that time. If staying up over 130/80 consistently recommend initiate cozaar 25mg daily and follow up one month after med initiation.          Insomnia     Insomnia noted in the setting of depression and anxiety.  The patient takes Wellbutrin 3 mg 24-hour tablet and Zoloft 150 mg p.o. daily.  In addition it appears that she was prescribed 10 tablets of Ambien 5 mg on 7/19/2023.         Overweight with body mass index (BMI) of 26 to 26.9 in adult     Weight with a BMI of 26.  Healthy lifestyle as discussed.          Other Visit Diagnoses       Visit for screening mammogram    -  Primary              BMI:   Estimated body mass index is 26.19 kg/m  as calculated from the following:    Height as of this encounter: 1.575 m (5' 2\").    Weight as of this encounter: 65 kg (143 lb 3.2 oz).   Weight management plan: Discussed healthy diet and exercise guidelines      Zora Wright MD  [facility]    Jillian Castro is a 52 year old, presenting for the following health issues:  Establish Care (Discuss hypothyroidism and had TSH lab draw on 9/8/23, provider stated it was normal, but pt doesn't feel normal, would like to discuss change in thyroid medication )        9/13/2023     9:16 AM   Additional Questions   Roomed by Mani Martinez MA   Accompanied by Self         9/13/2023     9:16 AM   Patient Reported Additional Medications   Patient reports taking the following new medications None       History of Present Illness       Hypothyroidism:     Since last visit, patient describes the following symptoms::  Anxiety, Depression, Fatigue and Weight gain    Weight gain::  6-10 lbs.    She eats 2-3 servings of fruits and vegetables daily.She consumes 0 sweetened beverage(s) daily.She exercises with enough effort to increase her heart rate 10 to 19 minutes per " "day.  She exercises with enough effort to increase her heart rate 3 or less days per week.   She is taking medications regularly.           Objective    BP (!) 140/85 (BP Location: Left arm, Patient Position: Sitting, Cuff Size: Adult Regular)   Pulse 101   Resp 16   Ht 1.575 m (5' 2\")   Wt 65 kg (143 lb 3.2 oz)   LMP  (LMP Unknown)   SpO2 96%   BMI 26.19 kg/m    Body mass index is 26.19 kg/m .  Physical Exam  Constitutional:       Appearance: Normal appearance.   HENT:      Head: Normocephalic and atraumatic.   Cardiovascular:      Rate and Rhythm: Normal rate and regular rhythm.   Pulmonary:      Effort: Pulmonary effort is normal.   Musculoskeletal:         General: Normal range of motion.      Cervical back: Normal range of motion and neck supple.   Neurological:      General: No focal deficit present.      Mental Status: She is alert and oriented to person, place, and time.                "

## 2023-10-20 ENCOUNTER — LAB (OUTPATIENT)
Dept: LAB | Facility: CLINIC | Age: 53
End: 2023-10-20
Payer: COMMERCIAL

## 2023-10-20 ENCOUNTER — DOCUMENTATION ONLY (OUTPATIENT)
Dept: FAMILY MEDICINE | Facility: CLINIC | Age: 53
End: 2023-10-20

## 2023-10-20 ENCOUNTER — ALLIED HEALTH/NURSE VISIT (OUTPATIENT)
Dept: FAMILY MEDICINE | Facility: CLINIC | Age: 53
End: 2023-10-20
Attending: FAMILY MEDICINE
Payer: COMMERCIAL

## 2023-10-20 VITALS — SYSTOLIC BLOOD PRESSURE: 156 MMHG | DIASTOLIC BLOOD PRESSURE: 108 MMHG

## 2023-10-20 DIAGNOSIS — E03.9 ACQUIRED HYPOTHYROIDISM: ICD-10-CM

## 2023-10-20 DIAGNOSIS — E03.9 ACQUIRED HYPOTHYROIDISM: Primary | ICD-10-CM

## 2023-10-20 DIAGNOSIS — R03.0 ELEVATED BLOOD PRESSURE READING WITHOUT DIAGNOSIS OF HYPERTENSION: Primary | ICD-10-CM

## 2023-10-20 LAB
HOLD SPECIMEN: NORMAL
TSH SERPL DL<=0.005 MIU/L-ACNC: 0.36 UIU/ML (ref 0.3–4.2)

## 2023-10-20 PROCEDURE — 99207 PR NO CHARGE NURSE ONLY: CPT

## 2023-10-20 PROCEDURE — 36415 COLL VENOUS BLD VENIPUNCTURE: CPT

## 2023-10-20 PROCEDURE — 84443 ASSAY THYROID STIM HORMONE: CPT

## 2023-10-20 NOTE — PROGRESS NOTES
Sil Mancuso has an upcoming lab appointment:    Future Appointments   Date Time Provider Department Center   10/20/2023  9:30 AM ST[facility] RN WAN [facility]   10/20/2023 10:00 AM [facility] LAB SWLABR [facility]   10/31/2023  8:30 AM [id number] WW   11/29/2023  3:00 PM Zora Wright MD [id number] [facility]     Patient is requesting for TSH test.    There is no order available. Please review and place either future orders or HMPO (Review of Health Maintenance Protocol Orders), as appropriate.    There are no preventive care reminders to display for this patient.  Gavi Martinez

## 2023-10-20 NOTE — PROGRESS NOTES
I met with Sil Mancuso at the request of OG Dennis CNP  to recheck her blood pressure.  Blood pressure medications on the med list were reviewed with patient.    Patient has taken all medications as per usual regimen: Yes  Patient reports tolerating them without any issues or concerns: Yes    Vitals:    10/20/23 0935 10/20/23 0941   BP: (!) 151/96 (!) 156/108   BP Location: Left arm Left arm   Patient Position: Sitting Sitting   Cuff Size: Adult Regular Adult Regular       After 5 minutes, the patient's blood pressure remained greater than or equal to 140/90.    Is the patient currently having any chest pain? No  Does the patient currently have a headache? No  Does the patient currently have any vision changes? No  Does the patient currently have any nausea? No  Does the patient currently have any abdominal pain? No    The previous encounter was reviewed.  The patient was discharged and the note will be sent to the provider for final review.

## 2023-10-20 NOTE — PROGRESS NOTES
Patient currently in clinic for TSH check and no orders available. Please review as covering and order as appropriate.     9/13 OV notes below-   Endocrine      Hypothyroidism       For thyroidism-currently on levothyroxine 150 mcg tablet.  Continue this as her most recent TSH was normal just a few days ago.     However, the patient reports she wants to see if she will feel better at levothyroxine 175mcg dose and see if the tsh will still remain in range with that dose because she knows her body and thinks this might be the case.      Recommend trial levothyroxine 175mcg and recheck tsh in 4-6 weeks.            Relevant Medications     levothyroxine (SYNTHROID/LEVOTHROID) 175 MCG tablet

## 2023-10-31 ENCOUNTER — HOSPITAL ENCOUNTER (OUTPATIENT)
Dept: MAMMOGRAPHY | Facility: CLINIC | Age: 53
Discharge: HOME | End: 2023-10-31
Attending: FAMILY MEDICINE | Admitting: FAMILY MEDICINE
Payer: COMMERCIAL

## 2023-10-31 DIAGNOSIS — Z12.31 VISIT FOR SCREENING MAMMOGRAM: ICD-10-CM

## 2023-10-31 PROCEDURE — 77067 SCR MAMMO BI INCL CAD: CPT

## 2023-11-10 DIAGNOSIS — E03.9 ACQUIRED HYPOTHYROIDISM: ICD-10-CM

## 2023-11-10 RX ORDER — LEVOTHYROXINE SODIUM 175 UG/1
175 TABLET ORAL DAILY
Qty: 90 TABLET | Refills: 2 | Status: SHIPPED | OUTPATIENT
Start: 2023-11-10 | End: 2023-11-29

## 2023-11-29 ENCOUNTER — OFFICE VISIT (OUTPATIENT)
Dept: FAMILY MEDICINE | Facility: CLINIC | Age: 53
End: 2023-11-29
Attending: FAMILY MEDICINE
Payer: COMMERCIAL

## 2023-11-29 VITALS
WEIGHT: 146 LBS | TEMPERATURE: 98.5 F | BODY MASS INDEX: 26.87 KG/M2 | SYSTOLIC BLOOD PRESSURE: 138 MMHG | HEART RATE: 87 BPM | RESPIRATION RATE: 16 BRPM | OXYGEN SATURATION: 96 % | HEIGHT: 62 IN | DIASTOLIC BLOOD PRESSURE: 95 MMHG

## 2023-11-29 DIAGNOSIS — E66.3 OVERWEIGHT WITH BODY MASS INDEX (BMI) OF 26 TO 26.9 IN ADULT: ICD-10-CM

## 2023-11-29 DIAGNOSIS — Z13.220 SCREENING, LIPID: ICD-10-CM

## 2023-11-29 DIAGNOSIS — Z30.8 ENCOUNTER FOR OTHER CONTRACEPTIVE MANAGEMENT: ICD-10-CM

## 2023-11-29 DIAGNOSIS — F32.5 MAJOR DEPRESSION IN REMISSION (H): ICD-10-CM

## 2023-11-29 DIAGNOSIS — E03.9 ACQUIRED HYPOTHYROIDISM: Primary | ICD-10-CM

## 2023-11-29 DIAGNOSIS — Z00.00 HEALTH CARE MAINTENANCE: ICD-10-CM

## 2023-11-29 DIAGNOSIS — I10 HYPERTENSION, UNSPECIFIED TYPE: ICD-10-CM

## 2023-11-29 DIAGNOSIS — F41.1 ANXIETY STATE: ICD-10-CM

## 2023-11-29 DIAGNOSIS — F51.01 PRIMARY INSOMNIA: ICD-10-CM

## 2023-11-29 DIAGNOSIS — G47.00 INSOMNIA, UNSPECIFIED TYPE: ICD-10-CM

## 2023-11-29 PROBLEM — Z30.9 CONTRACEPTIVE MANAGEMENT: Status: ACTIVE | Noted: 2023-11-29

## 2023-11-29 PROCEDURE — 99214 OFFICE O/P EST MOD 30 MIN: CPT | Mod: 25 | Performed by: FAMILY MEDICINE

## 2023-11-29 PROCEDURE — 90686 IIV4 VACC NO PRSV 0.5 ML IM: CPT | Performed by: FAMILY MEDICINE

## 2023-11-29 PROCEDURE — 90471 IMMUNIZATION ADMIN: CPT | Performed by: FAMILY MEDICINE

## 2023-11-29 PROCEDURE — 99396 PREV VISIT EST AGE 40-64: CPT | Mod: 25 | Performed by: FAMILY MEDICINE

## 2023-11-29 RX ORDER — LEVOTHYROXINE SODIUM 175 UG/1
175 TABLET ORAL DAILY
Qty: 90 TABLET | Refills: 3 | Status: SHIPPED | OUTPATIENT
Start: 2023-11-29

## 2023-11-29 RX ORDER — BUPROPION HYDROCHLORIDE 300 MG/1
300 TABLET ORAL EVERY MORNING
Qty: 90 TABLET | Refills: 1 | Status: SHIPPED | OUTPATIENT
Start: 2023-11-29 | End: 2024-04-22

## 2023-11-29 RX ORDER — PREDNISOLONE ACETATE 10 MG/ML
1 SUSPENSION/ DROPS OPHTHALMIC
COMMUNITY
Start: 2023-11-27

## 2023-11-29 RX ORDER — HYDROXYZINE PAMOATE 50 MG/1
100 CAPSULE ORAL AT BEDTIME
Qty: 90 CAPSULE | Refills: 3 | Status: SHIPPED | OUTPATIENT
Start: 2023-11-29 | End: 2024-04-22

## 2023-11-29 RX ORDER — MOXIFLOXACIN 5 MG/ML
1 SOLUTION/ DROPS OPHTHALMIC 3 TIMES DAILY
COMMUNITY
Start: 2023-11-27

## 2023-11-29 RX ORDER — SERTRALINE HYDROCHLORIDE 100 MG/1
TABLET, FILM COATED ORAL
Qty: 135 TABLET | Refills: 1 | Status: SHIPPED | OUTPATIENT
Start: 2023-11-29 | End: 2024-04-22

## 2023-11-29 RX ORDER — LOSARTAN POTASSIUM 25 MG/1
25 TABLET ORAL DAILY
Qty: 90 TABLET | Refills: 0 | Status: SHIPPED | OUTPATIENT
Start: 2023-11-29 | End: 2024-03-06

## 2023-11-29 ASSESSMENT — ENCOUNTER SYMPTOMS
SORE THROAT: 0
EYE PAIN: 0
HEARTBURN: 0
NAUSEA: 0
HEADACHES: 0
PALPITATIONS: 0
SHORTNESS OF BREATH: 0
DIZZINESS: 0
FREQUENCY: 0
PARESTHESIAS: 0
CONSTIPATION: 0
ARTHRALGIAS: 0
ABDOMINAL PAIN: 0
NERVOUS/ANXIOUS: 0
DIARRHEA: 0
HEMATURIA: 0
DYSURIA: 0
CHILLS: 0
COUGH: 0
JOINT SWELLING: 0
BREAST MASS: 0
FEVER: 0
HEMATOCHEZIA: 0
MYALGIAS: 0

## 2023-11-29 ASSESSMENT — PATIENT HEALTH QUESTIONNAIRE - PHQ9: SUM OF ALL RESPONSES TO PHQ QUESTIONS 1-9: 3

## 2023-11-29 NOTE — ASSESSMENT & PLAN NOTE
Anxiety and depression treated currently with Wellbutrin  mg 24-hour tablet and Zoloft 150 mg p.o. daily.

## 2023-11-29 NOTE — ASSESSMENT & PLAN NOTE
For thyroidism with normal TSH noted 10/2023-will continue levothyroxine 175 mcg orally per day.  Refilled

## 2023-11-29 NOTE — ASSESSMENT & PLAN NOTE
Contraception - nuvaring.   Recommended vaccines-hepatitis B, zoster and COVID  Pap: Due December 2024  Mammo:  11/1/2023 normal mammogram  Colonoscopy: Normal 2/10/2022-repeat in 10 years  Std testing desired:  offered  Osteoporosis prevention discussed.  vitamin d levels ordered. Recommend daily calcium and vitamin d intake to keep good bone health. Recommend weight bearing exercise, no tobacco, and limit alcohol  Dexa- due when in menopause.   Recommend sunscreen, exercise, & healthy diet.  Offered cbc, cmp, lipids and asked what other testing she  desires today  I have had an Advance Directives discussion with the patient. She has this at home.   Body mass index is 26.19 kg/m .   taishat active.

## 2023-11-29 NOTE — ASSESSMENT & PLAN NOTE
Hypertension-  The patient has had elevated blood pressure on 3 occasions since September 13, 2023.  Recommend initiation of Cozaar 25 mg p.o. daily and will get monitoring labs today and follow-up in 1 month for repeat BMP and blood pressure.

## 2023-11-29 NOTE — LETTER
My Depression Action Plan  Name: Sil Mancuso   Date of Birth 1970  Date: 11/29/2023    My doctor: Zora Wright   [facility] [address] [facility] [id number] [phone]            GREEN    ZONE   Good Control    What it looks like:   Things are going generally well. You have normal ups and downs. You may even feel depressed from time to time, but bad moods usually last less than a day.   What you need to do:  Continue to care for yourself (see self care plan)  Check your depression survival kit and update it as needed  Follow your physician s recommendations including any medication.  Do not stop taking medication unless you consult with your physician first.             YELLOW         ZONE Getting Worse    What it looks like:   Depression is starting to interfere with your life.   It may be hard to get out of bed; you may be starting to isolate yourself from others.  Symptoms of depression are starting to last most all day and this has happened for several days.   You may have suicidal thoughts but they are not constant.   What you need to do:     Call your care team. Your response to treatment will improve if you keep your care team informed of your progress. Yellow periods are signs an adjustment may need to be made.     Continue your self-care.  Just get dressed and ready for the day.  Don't give yourself time to talk yourself out of it.    Talk to someone in your support network.    Open up your Depression Self-Care Plan/Wellness Kit.             RED    ZONE Medical Alert - Get Help    What it looks like:   Depression is seriously interfering with your life.   You may experience these or other symptoms: You can t get out of bed most days, can t work or engage in other necessary activities, you have trouble taking care of basic hygiene, or basic responsibilities, thoughts of suicide or death that will not go away,  self-injurious behavior.     What you need to do:  Call your care team and request a same-day appointment. If they are not available (weekends or after hours) call your local crisis line, emergency room or 911.          Depression Self-Care Plan / Wellness Kit    Many people find that medication and therapy are helpful treatments for managing depression. In addition, making small changes to your everyday life can help to boost your mood and improve your wellbeing. Below are some tips for you to consider. Be sure to talk with your medical provider and/or behavioral health consultant if your symptoms are worsening or not improving.     Sleep   Sleep hygiene  means all of the habits that support good, restful sleep. It includes maintaining a consistent bedtime and wake time, using your bedroom only for sleeping or sex, and keeping the bedroom dark and free of distractions like a computer, smartphone, or television.     Develop a Healthy Routine  Maintain good hygiene. Get out of bed in the morning, make your bed, brush your teeth, take a shower, and get dressed. Don t spend too much time viewing media that makes you feel stressed. Find time to relax each day.    Exercise  Get some form of exercise every day. This will help reduce pain and release endorphins, the  feel good  chemicals in your brain. It can be as simple as just going for a walk or doing some gardening, anything that will get you moving.      Diet  Strive to eat healthy foods, including fruits and vegetables. Drink plenty of water. Avoid excessive sugar, caffeine, alcohol, and other mood-altering substances.     Stay Connected with Others  Stay in touch with friends and family members.    Manage Your Mood  Try deep breathing, massage therapy, biofeedback, or meditation. Take part in fun activities when you can. Try to find something to smile about each day.     Psychotherapy  Be open to working with a therapist if your provider recommends it.      Medication  Be sure to take your medication as prescribed. Most anti-depressants need to be taken every day. It usually takes several weeks for medications to work. Not all medicines work for all people. It is important to follow-up with your provider to make sure you have a treatment plan that is working for you. Do not stop your medication abruptly without first discussing it with your provider.    Crisis Resources   These hotlines are for both adults and children. They and are open 24 hours a day, 7 days a week unless noted otherwise.    National Suicide Prevention Lifeline   988 or 2-814-591-QYQV (3053)    Crisis Text Line    www.crisistextline.org  Text HOME to 364934 from anywhere in the [place], anytime, about any type of crisis. A live, trained crisis counselor will receive the text and respond quickly.    [facility] Lifeline for LGBTQ Youth  A national crisis intervention and suicide lifeline for LGBTQ youth under 25. Provides a safe place to talk without judgement. Call 1-141.176.7011; text START to 160014 or visit www.thetrevorproject.org to talk to a trained counselor.    For [facility] crisis numbers, visit the [facility] website at:  https://[place].gov/dhs/people-we-serve/adults/health-care/mental-health/resources/crisis-contacts.jsp

## 2023-11-29 NOTE — ASSESSMENT & PLAN NOTE
Insomnia, used 0-1 per month. Gets ambien from Amina Fry MD (gyn)  But more often uses vistaril 100mg po q hs.

## 2023-11-29 NOTE — PROGRESS NOTES
New diagnosis hypertension and prescription for Cozaar initiated, Wellbutrin, Zoloft, Vistaril, levothyroxine addressed addressed above and beyond usual scope of annual exam.       SUBJECTIVE:   Gloria is a 53 year old, presenting for the following:  Physical and Thyroid Disease (Here for recheck)        2023     2:56 PM   Additional Questions   Roomed by ac   Accompanied by self       Healthy Habits:     Getting at least 3 servings of Calcium per day:  Yes    Bi-annual eye exam:  Yes    Dental care twice a year:  Yes    Sleep apnea or symptoms of sleep apnea:  None    Diet:  Regular (no restrictions)    Frequency of exercise:  2-3 days/week    Duration of exercise:  Less than 15 minutes    Taking medications regularly:  Yes    Medication side effects:  None    Additional concerns today:  No      Social History     Tobacco Use    Smoking status: Never    Smokeless tobacco: Never   Substance Use Topics    Alcohol use: Yes     Alcohol/week: 4.0 standard drinks of alcohol     Comment: Alcoholic Drinks/day: occ           2023     2:38 PM   Alcohol Use   Prescreen: >3 drinks/day or >7 drinks/week? No     Reviewed orders with patient.  Reviewed health maintenance and updated orders accordingly - Yes    Breast Cancer Screenin/22/2021    10:02 AM   Breast CA Risk Assessment (FHS-7)   Do you have a family history of breast, colon, or ovarian cancer? No / Unknown       Mammogram Screening: Recommended annual mammography  Pertinent mammograms are reviewed under the imaging tab.    History of abnormal Pap smear: YES - updated in Problem List and Health Maintenance accordingly      Latest Ref Rng & Units 2021    10:53 AM 2018    10:17 AM   PAP / HPV   PAP  Negative for Intraepithelial Lesion or Malignancy (NILM)  Atypical squamous cells of undetermined significance  Electronically signed by Cyn Chino MD on 2018 at  4:24 PM      HPV 16 DNA Negative Negative  Negative    HPV 18 DNA  "Negative Negative  Negative    Other HR HPV Negative Negative  Negative      Reviewed and updated as needed this visit by clinical staff   Tobacco  Allergies  Meds  Problems  Med Hx  Surg Hx  Fam Hx          Reviewed and updated as needed this visit by Provider   Tobacco  Allergies  Meds  Problems  Med Hx  Surg Hx  Fam Hx             Review of Systems   Constitutional:  Negative for chills and fever.   HENT:  Positive for congestion. Negative for ear pain, hearing loss and sore throat.    Eyes:  Negative for pain and visual disturbance.   Respiratory:  Negative for cough and shortness of breath.    Cardiovascular:  Negative for chest pain, palpitations and peripheral edema.   Gastrointestinal:  Negative for abdominal pain, constipation, diarrhea, heartburn, hematochezia and nausea.   Breasts:  Negative for tenderness, breast mass and discharge.   Genitourinary:  Negative for dysuria, frequency, genital sores, hematuria, pelvic pain, urgency, vaginal bleeding and vaginal discharge.   Musculoskeletal:  Negative for arthralgias, joint swelling and myalgias.   Skin:  Negative for rash.   Neurological:  Negative for dizziness, headaches and paresthesias.   Psychiatric/Behavioral:  Negative for mood changes. The patient is not nervous/anxious.      Congestion -reports mild cold symptoms today and I recommend following up with this is not a simple cold and she needs further attention -she made it sound like it was mild today and exam was normal except for I did notice she was having a dry cough     OBJECTIVE:   BP (!) 138/95 (BP Location: Left arm, Patient Position: Sitting, Cuff Size: Adult Regular)   Pulse 87   Temp 98.5  F (36.9  C) (Oral)   Resp 16   Ht 1.575 m (5' 2\")   Wt 66.2 kg (146 lb)   SpO2 96%   BMI 26.70 kg/m    Physical Exam  Constitutional:       Appearance: Normal appearance.   HENT:      Head: Normocephalic and atraumatic.      Right Ear: Tympanic membrane, ear canal and external ear " normal.      Left Ear: Tympanic membrane, ear canal and external ear normal.      Nose: Nose normal.      Mouth/Throat:      Mouth: Mucous membranes are moist.      Pharynx: Oropharynx is clear.   Eyes:      Conjunctiva/sclera: Conjunctivae normal.      Pupils: Pupils are equal, round, and reactive to light.   Cardiovascular:      Rate and Rhythm: Normal rate and regular rhythm.      Heart sounds: Normal heart sounds.   Pulmonary:      Effort: Pulmonary effort is normal.      Breath sounds: Normal breath sounds.      Comments: dry cough noted on exam  Abdominal:      General: Bowel sounds are normal.      Palpations: Abdomen is soft.   Musculoskeletal:         General: Normal range of motion.      Cervical back: Normal range of motion and neck supple.   Neurological:      General: No focal deficit present.      Mental Status: She is alert and oriented to person, place, and time.               ASSESSMENT/PLAN:     Problem List Items Addressed This Visit          Endocrine    Hypothyroidism - Primary     For thyroidism with normal TSH noted 10/2023-will continue levothyroxine 175 mcg orally per day.  Refilled         Relevant Medications    levothyroxine (SYNTHROID/LEVOTHROID) 175 MCG tablet       Circulatory    Hypertension     Hypertension-  The patient has had elevated blood pressure on 3 occasions since September 13, 2023.  Recommend initiation of Cozaar 25 mg p.o. daily and will get monitoring labs today and follow-up in 1 month for repeat BMP and blood pressure.         Relevant Medications    losartan (COZAAR) 25 MG tablet    Other Relevant Orders    Basic metabolic panel  (Ca, Cl, CO2, Creat, Gluc, K, Na, BUN)       Behavioral    Major depression in remission (H24)     Anxiety and depression treated currently with Wellbutrin  mg 24-hour tablet and Zoloft 150 mg p.o. daily.          Relevant Medications    buPROPion (WELLBUTRIN XL) 300 MG 24 hr tablet    sertraline (ZOLOFT) 100 MG tablet    hydrOXYzine  (VISTARIL) 50 MG capsule       Other    Anxiety state     Anxiety and depression treated currently with Wellbutrin  mg 24-hour tablet and Zoloft 150 mg p.o. daily.          Relevant Medications    buPROPion (WELLBUTRIN XL) 300 MG 24 hr tablet    sertraline (ZOLOFT) 100 MG tablet    hydrOXYzine (VISTARIL) 50 MG capsule    Insomnia     Insomnia, used 0-1 per month. Gets ambien from Amina Fry MD (gyn)  But more often uses vistaril 100mg po q hs.          Relevant Medications    hydrOXYzine (VISTARIL) 50 MG capsule    Overweight with body mass index (BMI) of 26 to 26.9 in adult     Overweight with BMI 26.70 and chronic comorbid weight related conditions including hypertension     Weight reduction is strongly recommended.  Healthy lifestyle is discussed  Discussed strength training.          Health care maintenance     Contraception - nuvaring.   Recommended vaccines-hepatitis B, zoster and COVID  Pap: Due December 2024  Mammo:  11/1/2023 normal mammogram  Colonoscopy: Normal 2/10/2022-repeat in 10 years  Std testing desired:  offered  Osteoporosis prevention discussed.  vitamin d levels ordered. Recommend daily calcium and vitamin d intake to keep good bone health. Recommend weight bearing exercise, no tobacco, and limit alcohol  Dexa- due when in menopause.   Recommend sunscreen, exercise, & healthy diet.  Offered cbc, cmp, lipids and asked what other testing she  desires today  I have had an Advance Directives discussion with the patient. She has this at home.   Body mass index is 26.19 kg/m .   mychart active.          Contraceptive management     Contraceptive management-she is using NuvaRing-she sees a gynecologist who prescribes this for her Aimna Fry MD    I did discuss that she is age 53 and may be time to consider getting off of contraception as she maybe entering menopause.  She will discuss this with her gynecologist.          Other Visit Diagnoses       Screening, lipid         Relevant Orders    Lipid Profile            Patient has been advised of split billing requirements and indicates understanding: Yes      COUNSELING:  Reviewed preventive health counseling, as reflected in patient instructions        She reports that she has never smoked. She has never used smokeless tobacco.      Zora Wright MD  [facility]

## 2023-11-29 NOTE — ASSESSMENT & PLAN NOTE
Contraceptive management-she is using NuvaRing-she sees a gynecologist who prescribes this for her Amina Fry MD    I did discuss that she is age 53 and may be time to consider getting off of contraception as she maybe entering menopause.  She will discuss this with her gynecologist.

## 2023-11-29 NOTE — ASSESSMENT & PLAN NOTE
Overweight with BMI 26.70 and chronic comorbid weight related conditions including hypertension     Weight reduction is strongly recommended.  Healthy lifestyle is discussed  Discussed strength training.

## 2024-01-08 ENCOUNTER — OFFICE VISIT (OUTPATIENT)
Dept: FAMILY MEDICINE | Facility: CLINIC | Age: 54
End: 2024-01-08
Payer: COMMERCIAL

## 2024-01-08 VITALS
OXYGEN SATURATION: 96 % | SYSTOLIC BLOOD PRESSURE: 130 MMHG | WEIGHT: 145 LBS | DIASTOLIC BLOOD PRESSURE: 80 MMHG | RESPIRATION RATE: 16 BRPM | HEART RATE: 82 BPM | BODY MASS INDEX: 25.69 KG/M2 | TEMPERATURE: 99.5 F | HEIGHT: 63 IN

## 2024-01-08 DIAGNOSIS — E03.9 HYPOTHYROIDISM, UNSPECIFIED TYPE: ICD-10-CM

## 2024-01-08 DIAGNOSIS — F32.5 MAJOR DEPRESSION IN REMISSION (H): ICD-10-CM

## 2024-01-08 DIAGNOSIS — Z01.818 PREOP GENERAL PHYSICAL EXAM: Primary | ICD-10-CM

## 2024-01-08 DIAGNOSIS — I10 HYPERTENSION, UNSPECIFIED TYPE: ICD-10-CM

## 2024-01-08 DIAGNOSIS — H11.023 CENTRAL PTERYGIUM OF BOTH EYES: ICD-10-CM

## 2024-01-08 LAB
BASOPHILS # BLD AUTO: 0 10E3/UL (ref 0–0.2)
BASOPHILS NFR BLD AUTO: 0 %
EOSINOPHIL # BLD AUTO: 0.1 10E3/UL (ref 0–0.7)
EOSINOPHIL NFR BLD AUTO: 1 %
ERYTHROCYTE [DISTWIDTH] IN BLOOD BY AUTOMATED COUNT: 12 % (ref 10–15)
HCT VFR BLD AUTO: 38 % (ref 35–47)
HGB BLD-MCNC: 12.8 G/DL (ref 11.7–15.7)
IMM GRANULOCYTES # BLD: 0 10E3/UL
IMM GRANULOCYTES NFR BLD: 0 %
LYMPHOCYTES # BLD AUTO: 2.6 10E3/UL (ref 0.8–5.3)
LYMPHOCYTES NFR BLD AUTO: 34 %
MCH RBC QN AUTO: 30.8 PG (ref 26.5–33)
MCHC RBC AUTO-ENTMCNC: 33.7 G/DL (ref 31.5–36.5)
MCV RBC AUTO: 91 FL (ref 78–100)
MONOCYTES # BLD AUTO: 0.6 10E3/UL (ref 0–1.3)
MONOCYTES NFR BLD AUTO: 8 %
NEUTROPHILS # BLD AUTO: 4.4 10E3/UL (ref 1.6–8.3)
NEUTROPHILS NFR BLD AUTO: 57 %
PLATELET # BLD AUTO: 308 10E3/UL (ref 150–450)
RBC # BLD AUTO: 4.16 10E6/UL (ref 3.8–5.2)
WBC # BLD AUTO: 7.7 10E3/UL (ref 4–11)

## 2024-01-08 PROCEDURE — 36415 COLL VENOUS BLD VENIPUNCTURE: CPT

## 2024-01-08 PROCEDURE — 85025 COMPLETE CBC W/AUTO DIFF WBC: CPT

## 2024-01-08 PROCEDURE — 93005 ELECTROCARDIOGRAM TRACING: CPT

## 2024-01-08 PROCEDURE — 99214 OFFICE O/P EST MOD 30 MIN: CPT | Mod: 25

## 2024-01-08 PROCEDURE — 80048 BASIC METABOLIC PNL TOTAL CA: CPT

## 2024-01-08 RX ORDER — ERYTHROMYCIN 5 MG/G
OINTMENT OPHTHALMIC
COMMUNITY
Start: 2024-01-05

## 2024-01-08 RX ORDER — MULTIVIT WITH MINERALS/LUTEIN
TABLET ORAL
COMMUNITY
Start: 2023-11-27

## 2024-01-08 ASSESSMENT — PAIN SCALES - GENERAL: PAINLEVEL: NO PAIN (0)

## 2024-01-08 NOTE — PATIENT INSTRUCTIONS
Preparing for Your Surgery  Getting started  A nurse will call you to review your health history and instructions. They will give you an arrival time based on your scheduled surgery time. Please be ready to share:  Your doctor's clinic name and phone number  Your medical, surgical, and anesthesia history  A list of allergies and sensitivities  A list of medicines, including herbal treatments and over-the-counter drugs  Whether the patient has a legal guardian (ask how to send us the papers in advance)  Please tell us if you're pregnant--or if there's any chance you might be pregnant. Some surgeries may injure a fetus (unborn baby), so they require a pregnancy test. Surgeries that are safe for a fetus don't always need a test, and you can choose whether to have one.   If you have a child who's having surgery, please ask for a copy of Preparing for Your Child's Surgery.    Preparing for surgery  Within 10 to 30 days of surgery: Have a pre-op exam (sometimes called an H&P, or History and Physical). This can be done at a clinic or pre-operative center.  If you're having a , you may not need this exam. Talk to your care team.  At your pre-op exam, talk to your care team about all medicines you take. If you need to stop any medicines before surgery, ask when to start taking them again.  We do this for your safety. Many medicines can make you bleed too much during surgery. Some change how well surgery (anesthesia) drugs work.  Call your insurance company to let them know you're having surgery. (If you don't have insurance, call [phone].)  Call your clinic if there's any change in your health. This includes signs of a cold or flu (sore throat, runny nose, cough, rash, fever). It also includes a scrape or scratch near the surgery site.  If you have questions on the day of surgery, call your hospital or surgery center.  Eating and drinking guidelines  For your safety: Unless your surgeon tells you otherwise,  follow the guidelines below.  Eat and drink as usual until 8 hours before you arrive for surgery. After that, no food or milk.  Drink clear liquids until 2 hours before you arrive. These are liquids you can see through, like water, Gatorade, and Propel Water. They also include plain black coffee and tea (no cream or milk), candy, and breath mints. You can spit out gum when you arrive.  If you drink alcohol: Stop drinking it the night before surgery.  If your care team tells you to take medicine on the morning of surgery, it's okay to take it with a sip of water.  Preventing infection  Shower or bathe the night before and morning of your surgery. Follow the instructions your clinic gave you. (If no instructions, use regular soap.)  Don't shave or clip hair near your surgery site. We'll remove the hair if needed.  Don't smoke or vape the morning of surgery. You may chew nicotine gum up to 2 hours before surgery. A nicotine patch is okay.  Note: Some surgeries require you to completely quit smoking and nicotine. Check with your surgeon.  Your care team will make every effort to keep you safe from infection. We will:  Clean our hands often with soap and water (or an alcohol-based hand rub).  Clean the skin at your surgery site with a special soap that kills germs.  Give you a special gown to keep you warm. (Cold raises the risk of infection.)  Wear special hair covers, masks, gowns and gloves during surgery.  Give antibiotic medicine, if prescribed. Not all surgeries need antibiotics.  What to bring on the day of surgery  Photo ID and insurance card  Copy of your health care directive, if you have one  Glasses and hearing aids (bring cases)  You can't wear contacts during surgery  Inhaler and eye drops, if you use them (tell us about these when you arrive)  CPAP machine or breathing device, if you use them  A few personal items, if spending the night  If you have . . .  A pacemaker, ICD (cardiac defibrillator) or other  implant: Bring the ID card.  An implanted stimulator: Bring the remote control.  A legal guardian: Bring a copy of the certified (court-stamped) guardianship papers.  Please remove any jewelry, including body piercings. Leave jewelry and other valuables at home.  If you're going home the day of surgery  You must have a responsible adult drive you home. They should stay with you overnight as well.  If you don't have someone to stay with you, and you aren't safe to go home alone, we may keep you overnight. Insurance often won't pay for this.  After surgery  If it's hard to control your pain or you need more pain medicine, please call your surgeon's office.  Questions?   If you have any questions for your care team, list them here: _________________________________________________________________________________________________________________________________________________________________________ ____________________________________ ____________________________________ ____________________________________  For informational purposes only. Not to replace the advice of your health care provider. Copyright   2003, 2019 [employer] [facility]. All rights reserved. Clinically reviewed by Annabella Galvez MD. [employer] 256516 - REV 12/22.    How to Take Your Medication Before Surgery  - Take all of your medications before surgery except as noted below  - Hold losartan on day of surgery , stop taking any unnecessary vitamins 7 days prior to the surgery

## 2024-01-08 NOTE — PROGRESS NOTES
[facility]  [address]  [place] [facility] [address]  Phone: [phone]  Fax: [phone]  Primary Provider: Zora Wright  Pre-op Performing Provider: SHERWIN PRIDE      PREOPERATIVE EVALUATION:  Today's date: 1/8/2024    Gloria is a 53 year old, presenting for the following:  Pre-Op Exam (Surgery 1/10/24 [facility] , Dr Diaz , Oculoplastic procedure )        1/8/2024     3:00 PM   Additional Questions   Roomed by sylvain sousa cma       Surgical Information:  Surgery/Procedure: oculoplastic procedure   Surgery Location: [facility]   Surgeon:  Dr Erick Diaz   Surgery Date: 1/10/24   Time of Surgery: 7:45 am   Where patient plans to recover: At home with family  Fax number for surgical facility: [phone]     Assessment & Plan     The proposed surgical procedure is considered LOW risk.    Preop general physical exam  Central pterygium of both eyes  Patient is having an oculoplastic procedure for a pterygium on her left eye.  She plans to have her right eye done on a later date.  No acute concerns in regards to preop.  EKG tracing is in normal sinus rhythm with no acute ST changes per my interpretation.  Pending BMP results returned within normal limits patient is cleared for surgery.  - Basic Metabolic Panel  - CBC with Platelets & Differential ([facility] Only)  - EKG 12-lead, tracing only    Hypothyroidism, unspecified type  Patient currently taking levothyroxine.  Last TSH was checked on 10/20/2023 and was 0.36.  Patient educated she can continue taking levothyroxine through day of surgery.    Hypertension, unspecified type  Patient currently prescribed losartan.  She reports that she stopped taking this a few days ago in preparation for surgery.  I educated the patient she can take her losartan up until day of surgery.    Major depression in remission (H24)  Patient is taking bupropion, hydroxyzine, sertraline.  No  acute concerns at this time patient is educated she can continue with medication through day of surgery.       - No identified additional risk factors other than previously addressed    Antiplatelet or Anticoagulation Medication Instructions:   - Patient is on no antiplatelet or anticoagulation medications.    Additional Medication Instructions:   - ACE/ARB: HOLD on day of surgery (minimum 11 hours for general anesthesia).    RECOMMENDATION:  APPROVAL GIVEN to proceed with proposed procedure pending review of diagnostic evaluation.    Subjective       HPI related to upcoming procedure: History of bilateral pterygium.          1/8/2024     2:39 PM   Preop Questions   1. Have you ever had a heart attack or stroke? No   2. Have you ever had surgery on your heart or blood vessels, such as a stent placement, a coronary artery bypass, or surgery on an artery in your head, neck, heart, or legs? No   3. Do you have chest pain with activity? No   4. Do you have a history of  heart failure? No   5. Do you currently have a cold, bronchitis or symptoms of other infection? No   6. Do you have a cough, shortness of breath, or wheezing? No   7. Do you or anyone in your family have previous history of blood clots? No   8. Do you or does anyone in your family have a serious bleeding problem such as prolonged bleeding following surgeries or cuts? No   9. Have you ever had problems with anemia or been told to take iron pills? No   10. Have you had any abnormal blood loss such as black, tarry or bloody stools, or abnormal vaginal bleeding? No   11. Have you ever had a blood transfusion? No   12. Are you willing to have a blood transfusion if it is medically needed before, during, or after your surgery? Yes   13. Have you or any of your relatives ever had problems with anesthesia? No   14. Do you have sleep apnea, excessive snoring or daytime drowsiness? No   15. Do you have any artifical heart valves or other implanted medical devices  like a pacemaker, defibrillator, or continuous glucose monitor? No   16. Do you have artificial joints? No   17. Are you allergic to latex? No   18. Is there any chance that you may be pregnant? No       Health Care Directive:  Patient does not have a Health Care Directive or Living Will: Discussed advance care planning with patient; however, patient declined at this time.      Status of Chronic Conditions:  HYPERTENSION - Patient has longstanding history of HTN , currently denies any symptoms referable to elevated blood pressure. Specifically denies chest pain, palpitations, dyspnea, orthopnea, PND or peripheral edema. Blood pressure readings have been in normal range. Current medication regimen is as listed below. Patient denies any side effects of medication.     HYPOTHYROIDISM - Patient has a longstanding history of chronic Hypothyroidism. Patient has been doing well, noting no tremor, insomnia, hair loss or changes in skin texture. Continues to take medications as directed, without adverse reactions or side effects. Last TSH   Lab Results   Component Value Date    TSH 0.36 10/20/2023   .      Review of Systems  CONSTITUTIONAL: NEGATIVE for fever, chills, change in weight  INTEGUMENTARY/SKIN: NEGATIVE for worrisome rashes, moles or lesions  EYES: NEGATIVE for vision changes or irritation  ENT/MOUTH: NEGATIVE for ear, mouth and throat problems  RESP: NEGATIVE for significant cough or SOB  CV: NEGATIVE for chest pain, palpitations or peripheral edema  GI: NEGATIVE for nausea, abdominal pain, heartburn, or change in bowel habits  : NEGATIVE for frequency, dysuria, or hematuria  MUSCULOSKELETAL: NEGATIVE for significant arthralgias or myalgia  NEURO: NEGATIVE for weakness, dizziness or paresthesias  ENDOCRINE: NEGATIVE for temperature intolerance, skin/hair changes  HEME: NEGATIVE for bleeding problems  PSYCHIATRIC: NEGATIVE for changes in mood or affect    Patient Active Problem List    Diagnosis Date Noted     Health care maintenance 11/29/2023     Priority: Medium    Contraceptive management 11/29/2023     Priority: Medium    Anxiety state 09/13/2023     Priority: Medium     Created by Conversion  Replacement Utility updated for latest IMO load  Formatting of this note might be different from the original. Created by Conversion Replacement Utility updated for latest IMO load      Hypertension 09/13/2023     Priority: Medium    Insomnia 09/13/2023     Priority: Medium    Overweight with body mass index (BMI) of 26 to 26.9 in adult 09/13/2023     Priority: Medium    Major depression in remission (H24) 04/28/2023     Priority: Medium    H/O LEEP 12/30/2021     Priority: Medium     Per 2014 pap report, has history of a LEEP (date/dx unknown)  5/21/14 NIL pap, neg HPV  4/26/18 ASCUS, neg HPV  12/22/21 NIL pap, neg HPV. Plan: 3 year cotesting per provider      Hypothyroidism 01/04/2016     Priority: Medium      Past Medical History:   Diagnosis Date    Anxiety     Depression     Disease of thyroid gland     H/O LEEP 12/30/2021    Per 2014 pap report, has history of a LEEP (date/dx unknown) 5/21/14 NIL pap, neg HPV 4/26/18 ASCUS, neg HPV 12/22/21 NIL pap, neg HPV. Plan: await provider    Hypertension 09/2017    Stage 1    Irritable bowel syndrome      Past Surgical History:   Procedure Laterality Date    LEEP TX, CERVICAL      LUMBAR LAMINECTOMY      SPINE SURGERY       Current Outpatient Medications   Medication Sig Dispense Refill    buPROPion (WELLBUTRIN XL) 300 MG 24 hr tablet Take 1 tablet (300 mg) by mouth every morning 90 tablet 1    cholecalciferol, vitamin D3, 1,000 unit (25 mcg) tablet [CHOLECALCIFEROL, VITAMIN D3, 1,000 UNIT (25 MCG) TABLET] Take 1,000 Units by mouth daily.      etonogestrel-ethinyl estradiol (NUVARING) 0.12-0.015 MG/24HR vaginal ring Insert one (1) ring vaginally and leave in place for 3 consecutive weeks (21 days), then remove for 1 week.  INSERT 1 RING VAGINALLY,  LEAVE IN PLACE FOR 3 WEEKS,  REMOVE FOR 1 WEEK, THEN  INSERT NEW RING 3 each 3    hydrOXYzine (VISTARIL) 50 MG capsule Take 2 capsules (100 mg) by mouth at bedtime 90 capsule 3    Lactobacillus rhamnosus GG (CULTURELLE) 10-15 Billion cell capsule [LACTOBACILLUS RHAMNOSUS GG (CULTURELLE) 10-15 BILLION CELL CAPSULE] Take 1 capsule by mouth daily.      levothyroxine (SYNTHROID/LEVOTHROID) 175 MCG tablet Take 1 tablet (175 mcg) by mouth daily 90 tablet 3    sertraline (ZOLOFT) 100 MG tablet TAKE 1 AND 1/2 TABLETS DAILY BY MOUTH 135 tablet 1    vitamin C (ASCORBIC ACID) 1000 MG TABS PLEASE SEE ATTACHED FOR DETAILED DIRECTIONS      zolpidem (AMBIEN) 5 MG tablet 1 tablet by mouth at bedtime as needed for sleep 10 tablet 0    erythromycin (ROMYCIN) 5 MG/GM ophthalmic ointment  (Patient not taking: Reported on 1/8/2024)      losartan (COZAAR) 25 MG tablet Take 1 tablet (25 mg) by mouth daily (Patient not taking: Reported on 1/8/2024) 90 tablet 0    moxifloxacin (VIGAMOX) 0.5 % ophthalmic solution Place 1 drop Into the left eye 3 times daily (Patient not taking: Reported on 1/8/2024)      prednisoLONE acetate (PRED FORTE) 1 % ophthalmic suspension Place 1 drop Into the left eye (Patient not taking: Reported on 1/8/2024)         No Known Allergies     Social History     Tobacco Use    Smoking status: Never    Smokeless tobacco: Never   Substance Use Topics    Alcohol use: Yes     Alcohol/week: 4.0 standard drinks of alcohol     Comment: Alcoholic Drinks/day: occ     Family History   Problem Relation Age of Onset    Cancer Mother         lung    Alcoholism Father     Mental Illness Father         suicide    Depression Father     Early Death Father         Suicide    Alcoholism Sister     Mental Illness Sister     Alcoholism Sister     Mental Illness Sister     Depression Sister     Depression Sister     Arthritis Maternal Grandfather     Diabetes Paternal Grandmother     Heart Disease Paternal Grandfather     Mental Illness Son         ADHD    Mental  "Illness Son      History   Drug Use No         Objective     /80   Pulse 82   Temp 99.5  F (37.5  C)   Resp 16   Ht 1.588 m (5' 2.5\")   Wt 65.8 kg (145 lb)   SpO2 96%   BMI 26.10 kg/m      Physical Exam    GENERAL APPEARANCE: healthy, alert and no distress     HENT: ear canals and TM's normal and nose and mouth without ulcers or lesions     NECK: no adenopathy, no asymmetry, masses, or scars and thyroid normal to palpation     RESP: lungs clear to auscultation - no rales, rhonchi or wheezes     CV: regular rates and rhythm, normal S1 S2, no S3 or S4 and no murmur, click or rub     ABDOMEN:  soft, nontender, no HSM or masses and bowel sounds normal     MS: extremities normal- no gross deformities noted, no evidence of inflammation in joints, FROM in all extremities.     SKIN: no suspicious lesions or rashes     PSYCH: mentation appears normal. and affect normal/bright     LYMPHATICS: No cervical adenopathy    Recent Labs   Lab Test 09/08/23  0915   HGB 13.4         POTASSIUM 4.3   CR 0.97*        Diagnostics:  Labs pending at this time.  Results will be reviewed when available.   EKG: appears normal, NSR, normal axis, normal intervals, no acute ST/T changes c/w ischemia, unchanged from previous tracings    Revised Cardiac Risk Index (RCRI):  The patient has the following serious cardiovascular risks for perioperative complications:   - No serious cardiac risks = 0 points     RCRI Interpretation: 0 points: Class I (very low risk - 0.4% complication rate)       Signed Electronically by: Patricia Lovell PA-C  Copy of this evaluation report is provided to requesting physician.      "

## 2024-01-09 LAB
ANION GAP SERPL CALCULATED.3IONS-SCNC: 9 MMOL/L (ref 7–15)
ATRIAL RATE - MUSE: 72 BPM
BUN SERPL-MCNC: 16 MG/DL (ref 6–20)
CALCIUM SERPL-MCNC: 9.4 MG/DL (ref 8.6–10)
CHLORIDE SERPL-SCNC: 104 MMOL/L (ref 98–107)
CREAT SERPL-MCNC: 0.86 MG/DL (ref 0.51–0.95)
DEPRECATED HCO3 PLAS-SCNC: 26 MMOL/L (ref 22–29)
DIASTOLIC BLOOD PRESSURE - MUSE: NORMAL MMHG
EGFRCR SERPLBLD CKD-EPI 2021: 81 ML/MIN/1.73M2
GLUCOSE SERPL-MCNC: 93 MG/DL (ref 70–99)
INTERPRETATION ECG - MUSE: NORMAL
P AXIS - MUSE: 54 DEGREES
POTASSIUM SERPL-SCNC: 4.2 MMOL/L (ref 3.4–5.3)
PR INTERVAL - MUSE: 132 MS
QRS DURATION - MUSE: 80 MS
QT - MUSE: 418 MS
QTC - MUSE: 457 MS
R AXIS - MUSE: 0 DEGREES
SODIUM SERPL-SCNC: 139 MMOL/L (ref 135–145)
SYSTOLIC BLOOD PRESSURE - MUSE: NORMAL MMHG
T AXIS - MUSE: 38 DEGREES
VENTRICULAR RATE- MUSE: 72 BPM

## 2024-01-09 PROCEDURE — 93010 ELECTROCARDIOGRAM REPORT: CPT | Performed by: GENERAL ACUTE CARE HOSPITAL

## 2024-02-15 NOTE — PROGRESS NOTES
"CC:  Cervical polyp and f/u sleep issues  HPI:  Sil Mancuso is a 51 year old female No LMP recorded. (Menstrual status: Birth Control).  Started the nuvaring back up and definitely feeling better. Can fall asleep fine but wakes up to void every night and cannot go back to sleep. Has tried really emptying bladder before bed, limits intake of liquids and nothing after 6 pm, etc. would like something more to help with her sleep.    Was at annual exam and PCP saw cervical polyp so here for removal.     Allergies: Patient has no known allergies.    EXAM:  Blood pressure (!) 132/90, pulse 90, height 1.581 m (5' 2.25\"), weight 62 kg (136 lb 12 oz), SpO2 94 %, not currently breastfeeding.  General - pleasant female in no acute distress.  Pelvic - EG: normal adult female, BUS: within normal limits, Vagina: well rugated, no discharge, Cervix: no lesions or CMT  Rectovaginal - deferred.  Psychiactric - appropriate mood and affect  Neurological - alert and oriented X 3    Procedure:  After verbal consent was obtained from the patient, xylocaine 2% jelly was applied to the cervical polyp.  A ring forcep was used to grasp the polyp and it was twisted off in the usual fashion.  Silver nitrate was used for hemostasis.  Specimen was submitted to pathology. Patient tolerated the procedure well. EBL minimal.    ASSESSMENT/PLAN:  (N84.1) Cervical polyp  (primary encounter diagnosis)  Comment: Removed  Plan: Surgical Pathology Exam, BIOPSY/EXCIS CERVICAL         LESION W/WO FULGURATION        Small cervical polyp discussed likely benign and will let her know pathology results.    She has trazodone still at home and had tried taking this when she was not on the NuvaRing and it was not helpful.  She will try this again now that she is on the NuvaRing and let me know if not helpful with sleep.    (Z12.12) Screening for malignant neoplasm of the rectum  Comment: >age 45  Plan: Adult Gastro Ref - Procedure Only        Referral for " Remicade infusion complete @ this time.  Pt tolerated infusion without difficulty.  No S+S of adverse reactions noted.  Vital signs stable, afebrile throughout infusion.  IV to left AC d/c'd.  Catheter intact.  Pressure dressing with gauze + coban applied to site.  Pt tolerated procedure well.  Pt + his parents instructed to return to clinic in 8 weeks for next infusion, but to call clinic sooner for S+S of flare, pt to drink fluids to stay hydrated, + to call clinic for any problems or concerns. Stool kit + instructions provided + reviewed with family. They repeated back instructions + verbalized complete understanding.   colonoscopy done.       Amina Fry MD

## 2024-03-06 DIAGNOSIS — I10 HYPERTENSION, UNSPECIFIED TYPE: ICD-10-CM

## 2024-03-06 RX ORDER — LOSARTAN POTASSIUM 25 MG/1
25 TABLET ORAL DAILY
Qty: 90 TABLET | Refills: 2 | Status: SHIPPED | OUTPATIENT
Start: 2024-03-06

## 2024-03-29 ENCOUNTER — E-VISIT (OUTPATIENT)
Dept: OBGYN | Facility: CLINIC | Age: 54
End: 2024-03-29
Payer: COMMERCIAL

## 2024-03-29 DIAGNOSIS — G47.00 INSOMNIA, UNSPECIFIED TYPE: Primary | ICD-10-CM

## 2024-03-29 PROCEDURE — 99421 OL DIG E/M SVC 5-10 MIN: CPT | Performed by: OBSTETRICS & GYNECOLOGY

## 2024-04-09 RX ORDER — PROGESTERONE 100 MG/1
100 CAPSULE ORAL AT BEDTIME
Qty: 90 CAPSULE | Refills: 4 | Status: SHIPPED | OUTPATIENT
Start: 2024-04-09

## 2024-04-17 DIAGNOSIS — F51.01 PRIMARY INSOMNIA: ICD-10-CM

## 2024-04-17 RX ORDER — HYDROXYZINE PAMOATE 50 MG/1
100 CAPSULE ORAL AT BEDTIME
Qty: 180 CAPSULE | Refills: 1 | OUTPATIENT
Start: 2024-04-17

## 2024-04-19 DIAGNOSIS — F41.1 ANXIETY STATE: ICD-10-CM

## 2024-04-19 DIAGNOSIS — F32.5 MAJOR DEPRESSION IN REMISSION (H): ICD-10-CM

## 2024-04-19 DIAGNOSIS — F51.01 PRIMARY INSOMNIA: ICD-10-CM

## 2024-04-22 RX ORDER — BUPROPION HYDROCHLORIDE 300 MG/1
300 TABLET ORAL EVERY MORNING
Qty: 90 TABLET | Refills: 1 | Status: SHIPPED | OUTPATIENT
Start: 2024-04-22

## 2024-04-22 RX ORDER — HYDROXYZINE PAMOATE 50 MG/1
100 CAPSULE ORAL AT BEDTIME
Qty: 180 CAPSULE | Refills: 1 | Status: SHIPPED | OUTPATIENT
Start: 2024-04-22 | End: 2024-10-03

## 2024-04-22 RX ORDER — SERTRALINE HYDROCHLORIDE 100 MG/1
TABLET, FILM COATED ORAL
Qty: 135 TABLET | Refills: 1 | Status: SHIPPED | OUTPATIENT
Start: 2024-04-22

## 2024-07-13 DIAGNOSIS — G47.09 OTHER INSOMNIA: ICD-10-CM

## 2024-07-15 RX ORDER — ETONOGESTREL AND ETHINYL ESTRADIOL .12; .015 MG/D; MG/D
RING VAGINAL
Qty: 3 EACH | Refills: 3 | Status: SHIPPED | OUTPATIENT
Start: 2024-07-15

## 2024-09-02 DIAGNOSIS — F41.1 ANXIETY STATE: ICD-10-CM

## 2024-09-02 DIAGNOSIS — F32.5 MAJOR DEPRESSION IN REMISSION (H): ICD-10-CM

## 2024-09-03 RX ORDER — BUPROPION HYDROCHLORIDE 300 MG/1
300 TABLET ORAL EVERY MORNING
Qty: 90 TABLET | Refills: 1 | OUTPATIENT
Start: 2024-09-03

## 2024-09-03 RX ORDER — SERTRALINE HYDROCHLORIDE 100 MG/1
TABLET, FILM COATED ORAL
Qty: 135 TABLET | Refills: 1 | OUTPATIENT
Start: 2024-09-03

## 2024-10-02 DIAGNOSIS — F51.01 PRIMARY INSOMNIA: ICD-10-CM

## 2024-10-03 RX ORDER — HYDROXYZINE PAMOATE 50 MG/1
100 CAPSULE ORAL AT BEDTIME
Qty: 180 CAPSULE | Refills: 0 | Status: SHIPPED | OUTPATIENT
Start: 2024-10-03

## 2024-10-30 ENCOUNTER — PATIENT OUTREACH (OUTPATIENT)
Dept: CARE COORDINATION | Facility: CLINIC | Age: 54
End: 2024-10-30
Payer: COMMERCIAL

## 2024-10-31 ENCOUNTER — OFFICE VISIT (OUTPATIENT)
Dept: FAMILY MEDICINE | Facility: CLINIC | Age: 54
End: 2024-10-31
Payer: COMMERCIAL

## 2024-10-31 VITALS
TEMPERATURE: 98.7 F | SYSTOLIC BLOOD PRESSURE: 140 MMHG | DIASTOLIC BLOOD PRESSURE: 90 MMHG | RESPIRATION RATE: 18 BRPM | HEART RATE: 88 BPM | WEIGHT: 144 LBS | HEIGHT: 63 IN | OXYGEN SATURATION: 98 % | BODY MASS INDEX: 25.52 KG/M2

## 2024-10-31 DIAGNOSIS — N32.81 OVERACTIVE BLADDER: ICD-10-CM

## 2024-10-31 DIAGNOSIS — Z23 NEED FOR VIRAL IMMUNIZATION: ICD-10-CM

## 2024-10-31 DIAGNOSIS — R30.0 DYSURIA: Primary | ICD-10-CM

## 2024-10-31 DIAGNOSIS — E03.9 HYPOTHYROIDISM, UNSPECIFIED TYPE: ICD-10-CM

## 2024-10-31 DIAGNOSIS — F51.01 PRIMARY INSOMNIA: ICD-10-CM

## 2024-10-31 LAB
ALBUMIN UR-MCNC: NEGATIVE MG/DL
APPEARANCE UR: CLEAR
BACTERIA #/AREA URNS HPF: ABNORMAL /HPF
BILIRUB UR QL STRIP: NEGATIVE
COLOR UR AUTO: YELLOW
GLUCOSE UR STRIP-MCNC: NEGATIVE MG/DL
HGB UR QL STRIP: NEGATIVE
KETONES UR STRIP-MCNC: NEGATIVE MG/DL
LEUKOCYTE ESTERASE UR QL STRIP: ABNORMAL
NITRATE UR QL: NEGATIVE
PH UR STRIP: 5.5 [PH] (ref 5–8)
RBC #/AREA URNS AUTO: ABNORMAL /HPF
SP GR UR STRIP: 1.01 (ref 1–1.03)
SQUAMOUS #/AREA URNS AUTO: ABNORMAL /LPF
UROBILINOGEN UR STRIP-ACNC: 0.2 E.U./DL
WBC #/AREA URNS AUTO: ABNORMAL /HPF

## 2024-10-31 PROCEDURE — 84443 ASSAY THYROID STIM HORMONE: CPT | Performed by: NURSE PRACTITIONER

## 2024-10-31 PROCEDURE — 84439 ASSAY OF FREE THYROXINE: CPT | Performed by: NURSE PRACTITIONER

## 2024-10-31 PROCEDURE — 90656 IIV3 VACC NO PRSV 0.5 ML IM: CPT | Performed by: NURSE PRACTITIONER

## 2024-10-31 PROCEDURE — 90471 IMMUNIZATION ADMIN: CPT | Performed by: NURSE PRACTITIONER

## 2024-10-31 PROCEDURE — 36415 COLL VENOUS BLD VENIPUNCTURE: CPT | Performed by: NURSE PRACTITIONER

## 2024-10-31 PROCEDURE — 99214 OFFICE O/P EST MOD 30 MIN: CPT | Mod: 25 | Performed by: NURSE PRACTITIONER

## 2024-10-31 PROCEDURE — 81001 URINALYSIS AUTO W/SCOPE: CPT | Performed by: NURSE PRACTITIONER

## 2024-10-31 RX ORDER — ZOLPIDEM TARTRATE 5 MG/1
TABLET ORAL
Qty: 10 TABLET | Refills: 0 | Status: SHIPPED | OUTPATIENT
Start: 2024-10-31

## 2024-10-31 ASSESSMENT — PATIENT HEALTH QUESTIONNAIRE - PHQ9
10. IF YOU CHECKED OFF ANY PROBLEMS, HOW DIFFICULT HAVE THESE PROBLEMS MADE IT FOR YOU TO DO YOUR WORK, TAKE CARE OF THINGS AT HOME, OR GET ALONG WITH OTHER PEOPLE: SOMEWHAT DIFFICULT
SUM OF ALL RESPONSES TO PHQ QUESTIONS 1-9: 9
SUM OF ALL RESPONSES TO PHQ QUESTIONS 1-9: 9

## 2024-10-31 NOTE — PROGRESS NOTES
"    Assessment & Plan     Dysuria  Negative UA  Discussed likely overactive bladder and offered medications-however patient prefers to follow up with gynecology/urology before starting anything.    - UA Macroscopic with reflex to Microscopic and Culture - Clinic Collect  - UA Microscopic with Reflex to Culture  - Adult Uro/Gyn  Referral; Future    Primary insomnia  Refilled x1. Discussed with patient this is a controlled substance and would need to be filled by primary provider if needing to continue.    - zolpidem (AMBIEN) 5 MG tablet; 1 tablet by mouth at bedtime as needed for sleep    Hypothyroidism, unspecified type  TSH slightly elevated at 4.73-possibly contributing to bladder symptoms. Will increased levothyroxine dose to 200 mcg. Follow up with repeat TSH/T4 in 6-8 weeks.    - TSH WITH FREE T4 REFLEX; Future  - TSH WITH FREE T4 REFLEX  - T4 free  - levothyroxine (SYNTHROID/LEVOTHROID) 200 MCG tablet; Take 1 tablet (200 mcg) by mouth daily.    Overactive bladder  Referral placed as above.    - Adult Uro/Gyn  Referral; Future    Need for viral immunization    - INFLUENZA VACCINE, SPLIT VIRUS, TRIVALENT,PF (FLUZONE\FLUARIX)    The longitudinal plan of care for the diagnosis(es)/condition(s) as documented were addressed during this visit. Due to the added complexity in care, I will continue to support Gloria in the subsequent management and with ongoing continuity of care.    BMI  Estimated body mass index is 25.9 kg/m  as calculated from the following:    Height as of this encounter: 1.588 m (5' 2.52\").    Weight as of this encounter: 65.3 kg (144 lb).             Subjective   Gloria is a 54 year old, presenting for the following health issues:  Bladder Problems (Constant urge of needing to use the bathroom. When pt does go to the restroom she does not feel like she empties bladder completely x a month . Refill on ambien if able )        10/31/2024     4:19 PM   Additional Questions   Roomed " "by tayler rios     History of Present Illness       Reason for visit:  Trouble emptying my bladder.    She eats 2-3 servings of fruits and vegetables daily.She consumes 0 sweetened beverage(s) daily.She exercises with enough effort to increase her heart rate 9 or less minutes per day.  She exercises with enough effort to increase her heart rate 3 or less days per week.   She is taking medications regularly.     Feels like just urge-doesn't necessarily feel like UTI. No incontinence episodes.    Vaginal births for 2 children.    Over the last month doesn't seem like she is fully emptying bladder. Feels like she has to go again right way.     Unknown if has had bladder or vaginal prolapse.    Not necessarily feeling like bladder spasms.                       Objective    BP (!) 140/90   Pulse 88   Temp 98.7  F (37.1  C) (Oral)   Resp 18   Ht 1.588 m (5' 2.52\")   Wt 65.3 kg (144 lb)   SpO2 98%   BMI 25.90 kg/m    Body mass index is 25.9 kg/m .  Physical Exam  Constitutional:       Appearance: Normal appearance.   Cardiovascular:      Rate and Rhythm: Normal rate and regular rhythm.   Abdominal:      General: Abdomen is flat.      Tenderness: There is no abdominal tenderness. There is no right CVA tenderness or left CVA tenderness.   Neurological:      General: No focal deficit present.      Mental Status: She is alert and oriented to person, place, and time.   Psychiatric:         Mood and Affect: Mood normal.         Behavior: Behavior normal.            Results for orders placed or performed in visit on 10/31/24   UA Macroscopic with reflex to Microscopic and Culture - Clinic Collect     Status: Abnormal    Specimen: Urine, Clean Catch   Result Value Ref Range    Color Urine Yellow Colorless, Straw, Light Yellow, Yellow    Appearance Urine Clear Clear    Glucose Urine Negative Negative mg/dL    Bilirubin Urine Negative Negative    Ketones Urine Negative Negative mg/dL    Specific Gravity Urine 1.010 1.005 - 1.030 "    Blood Urine Negative Negative    pH Urine 5.5 5.0 - 8.0    Protein Albumin Urine Negative Negative mg/dL    Urobilinogen Urine 0.2 0.2, 1.0 E.U./dL    Nitrite Urine Negative Negative    Leukocyte Esterase Urine Trace (A) Negative   UA Microscopic with Reflex to Culture     Status: Abnormal   Result Value Ref Range    Bacteria Urine None Seen None Seen /HPF    RBC Urine 0-2 0-2 /HPF /HPF    WBC Urine 0-5 0-5 /HPF /HPF    Squamous Epithelials Urine Few (A) None Seen /LPF    Narrative    Urine Culture not indicated   TSH WITH FREE T4 REFLEX     Status: Abnormal   Result Value Ref Range    TSH 4.73 (H) 0.30 - 4.20 uIU/mL   T4 free     Status: Normal   Result Value Ref Range    Free T4 1.25 0.90 - 1.70 ng/dL           Signed Electronically by: OG GUEVARA CNP

## 2024-11-01 LAB
T4 FREE SERPL-MCNC: 1.25 NG/DL (ref 0.9–1.7)
TSH SERPL DL<=0.005 MIU/L-ACNC: 4.73 UIU/ML (ref 0.3–4.2)

## 2024-11-04 RX ORDER — LEVOTHYROXINE SODIUM 200 UG/1
200 TABLET ORAL DAILY
Qty: 90 TABLET | Refills: 0 | Status: SHIPPED | OUTPATIENT
Start: 2024-11-04

## 2024-11-05 ENCOUNTER — HOSPITAL ENCOUNTER (OUTPATIENT)
Dept: MAMMOGRAPHY | Facility: CLINIC | Age: 54
Discharge: HOME | End: 2024-11-05
Attending: FAMILY MEDICINE | Admitting: FAMILY MEDICINE
Payer: COMMERCIAL

## 2024-11-05 DIAGNOSIS — Z12.31 VISIT FOR SCREENING MAMMOGRAM: ICD-10-CM

## 2024-11-05 PROCEDURE — 77063 BREAST TOMOSYNTHESIS BI: CPT

## 2024-11-13 ENCOUNTER — PATIENT OUTREACH (OUTPATIENT)
Dept: CARE COORDINATION | Facility: CLINIC | Age: 54
End: 2024-11-13
Payer: COMMERCIAL

## 2024-11-21 ENCOUNTER — OFFICE VISIT (OUTPATIENT)
Dept: UROLOGY | Facility: CLINIC | Age: 54
End: 2024-11-21
Payer: COMMERCIAL

## 2024-11-21 VITALS
HEART RATE: 93 BPM | SYSTOLIC BLOOD PRESSURE: 129 MMHG | WEIGHT: 144 LBS | BODY MASS INDEX: 25.9 KG/M2 | OXYGEN SATURATION: 99 % | DIASTOLIC BLOOD PRESSURE: 87 MMHG

## 2024-11-21 DIAGNOSIS — N32.81 OVERACTIVE BLADDER: ICD-10-CM

## 2024-11-21 DIAGNOSIS — R30.0 DYSURIA: ICD-10-CM

## 2024-11-21 LAB
ALBUMIN UR-MCNC: NEGATIVE MG/DL
APPEARANCE UR: CLEAR
BILIRUB UR QL STRIP: NEGATIVE
COLOR UR AUTO: YELLOW
GLUCOSE UR STRIP-MCNC: NEGATIVE MG/DL
HGB UR QL STRIP: NEGATIVE
KETONES UR STRIP-MCNC: NEGATIVE MG/DL
LEUKOCYTE ESTERASE UR QL STRIP: ABNORMAL
NITRATE UR QL: NEGATIVE
PH UR STRIP: 7 [PH] (ref 5–7)
RBC #/AREA URNS AUTO: ABNORMAL /HPF
SP GR UR STRIP: 1.01 (ref 1–1.03)
SQUAMOUS #/AREA URNS AUTO: ABNORMAL /LPF
UROBILINOGEN UR STRIP-ACNC: 0.2 E.U./DL
WBC #/AREA URNS AUTO: ABNORMAL /HPF

## 2024-11-21 RX ORDER — TOLTERODINE 4 MG/1
4 CAPSULE, EXTENDED RELEASE ORAL DAILY
Qty: 90 CAPSULE | Refills: 3 | Status: SHIPPED | OUTPATIENT
Start: 2024-11-21

## 2024-11-21 NOTE — PROGRESS NOTES
CC: Urinary Frequency    HPI:Ms. Sil Mancuso, is a pleasant 54 year old female, requested to be seen by Namrata King for evaluation of urinary Frequency.  This problem has been going on for a couple and has been getting progressively worse.  Incontinence is not associated with laughing, sneezing, coughing, and bending at the waist.  She reports urgency. She reports that she is urinating about every 1.5 hours, nocturia x 1-2.     The patient denies any urinary frequency or urgency.  She denies any dysuria, hematuria, pyuria, hesitancy, intermittency, feeling of incomplete emptying, or any recent history of UTI's or stones.    The patient denies constipation or splinting.  She is sexually active and denies any dyspareunia or pelvic pain.   She denies a vaginal bulge.  She has no neurological or balance problems.    OBSTETRIC HISTORY:     Babies were delivered vaginally .  Periods suppressed from estrogen ring     Fluids: Soda 2    Water at least 64 oz    Alcohol occasional     Past Medical History:   Diagnosis Date    Anxiety     Depression     Disease of thyroid gland     H/O LEEP 12/30/2021    Per 2014 pap report, has history of a LEEP (date/dx unknown) 5/21/14 NIL pap, neg HPV 4/26/18 ASCUS, neg HPV 12/22/21 NIL pap, neg HPV. Plan: await provider    Hypertension 09/2017    Stage 1    Irritable bowel syndrome      Past Surgical History:   Procedure Laterality Date    LEEP TX, CERVICAL      LUMBAR LAMINECTOMY      SPINE SURGERY       Current Outpatient Medications   Medication Sig Dispense Refill    buPROPion (WELLBUTRIN XL) 300 MG 24 hr tablet TAKE 1 TABLET BY MOUTH EVERY MORNING 90 tablet 1    cholecalciferol, vitamin D3, 1,000 unit (25 mcg) tablet [CHOLECALCIFEROL, VITAMIN D3, 1,000 UNIT (25 MCG) TABLET] Take 1,000 Units by mouth daily.      etonogestrel-ethinyl estradiol (ELURYNG) 0.12-0.015 MG/24HR vaginal ring INSERT 1 RING VAGINALLY, LEAVE IN PLACE FOR 3 WEEKS, REMOVE FOR 1 WEEK, THEN INSERT NEW RING 3  each 3    hydrOXYzine (VISTARIL) 50 MG capsule TAKE 2 CAPSULES (100 MG) BY MOUTH AT BEDTIME 180 capsule 0    Lactobacillus rhamnosus GG (CULTURELLE) 10-15 Billion cell capsule [LACTOBACILLUS RHAMNOSUS GG (CULTURELLE) 10-15 BILLION CELL CAPSULE] Take 1 capsule by mouth daily.      levothyroxine (SYNTHROID/LEVOTHROID) 175 MCG tablet Take 1 tablet (175 mcg) by mouth daily 90 tablet 3    levothyroxine (SYNTHROID/LEVOTHROID) 200 MCG tablet Take 1 tablet (200 mcg) by mouth daily. 90 tablet 0    losartan (COZAAR) 25 MG tablet TAKE 1 TABLET BY MOUTH EVERY DAY 90 tablet 2    progesterone (PROMETRIUM) 100 MG capsule Take 1 capsule (100 mg) by mouth at bedtime 90 capsule 4    sertraline (ZOLOFT) 100 MG tablet TAKE 1 AND 1/2 TABLETS BY MOUTH DAILY 135 tablet 1    zolpidem (AMBIEN) 5 MG tablet 1 tablet by mouth at bedtime as needed for sleep 10 tablet 0     No Known Allergies    FAMILY HISTORY: The patient denies any history of genitourinary carcinoma and no history of urolithiasis.    SOCIAL HISTORY: The patient does not smoke cigarettes, denies EtOH and illicit drug abuse.      ROS: A comprehensive 10 point ROS was obtained and negative except for that outlined above in the HPI.    PHYSICAL EXAM:   Vitals:    11/21/24 0858   BP: 129/87   Pulse: 93   SpO2: 99%   Weight: 65.3 kg (144 lb)     GENERAL: alert and no distress  EYES: Eyes grossly normal to inspection.  No discharge or erythema, or obvious scleral/conjunctival abnormalities.  RESP: No audible wheeze, cough, or visible cyanosis.    SKIN: Visible skin clear. No significant rash, abnormal pigmentation or lesions.  NEURO: Cranial nerves grossly intact.  Mentation and speech appropriate for age.  PSYCH: Appropriate affect, tone, and pace of words      PVR: Postvoid residual urine volume as measured by ultrasound was 7 mL.    Office Visit on 10/31/2024   Component Date Value Ref Range Status    Color Urine 10/31/2024 Yellow  Colorless, Straw, Light Yellow, Yellow Final     Appearance Urine 10/31/2024 Clear  Clear Final    Glucose Urine 10/31/2024 Negative  Negative mg/dL Final    Bilirubin Urine 10/31/2024 Negative  Negative Final    Ketones Urine 10/31/2024 Negative  Negative mg/dL Final    Specific Gravity Urine 10/31/2024 1.010  1.005 - 1.030 Final    Blood Urine 10/31/2024 Negative  Negative Final    pH Urine 10/31/2024 5.5  5.0 - 8.0 Final    Protein Albumin Urine 10/31/2024 Negative  Negative mg/dL Final    Urobilinogen Urine 10/31/2024 0.2  0.2, 1.0 E.U./dL Final    Nitrite Urine 10/31/2024 Negative  Negative Final    Leukocyte Esterase Urine 10/31/2024 Trace (A)  Negative Final    Bacteria Urine 10/31/2024 None Seen  None Seen /HPF Final    RBC Urine 10/31/2024 0-2  0-2 /HPF /HPF Final    WBC Urine 10/31/2024 0-5  0-5 /HPF /HPF Final    Squamous Epithelials Urine 10/31/2024 Few (A)  None Seen /LPF Final    TSH 10/31/2024 4.73 (H)  0.30 - 4.20 uIU/mL Final    Free T4 10/31/2024 1.25  0.90 - 1.70 ng/dL Final     Results for orders placed or performed in visit on 11/21/24   UA Macroscopic with reflex to Microscopic and Culture     Status: Abnormal    Specimen: Urine, Clean Catch   Result Value Ref Range    Color Urine Yellow Colorless, Straw, Light Yellow, Yellow    Appearance Urine Clear Clear    Glucose Urine Negative Negative mg/dL    Bilirubin Urine Negative Negative    Ketones Urine Negative Negative mg/dL    Specific Gravity Urine 1.010 1.003 - 1.035    Blood Urine Negative Negative    pH Urine 7.0 5.0 - 7.0    Protein Albumin Urine Negative Negative mg/dL    Urobilinogen Urine 0.2 0.2, 1.0 E.U./dL    Nitrite Urine Negative Negative    Leukocyte Esterase Urine Small (A) Negative   UA Microscopic with Reflex to Culture     Status: Abnormal   Result Value Ref Range    RBC Urine 0-2 0-2 /HPF /HPF    WBC Urine 0-5 0-5 /HPF /HPF    Squamous Epithelials Urine Few (A) None Seen /LPF    Narrative    Urine Culture not indicated         IMAGING: No recent    ASSESSMENT and PLAN:     Ms. Sil Mancuso is a 54 year old female with overactive bladder.  Different management options were discussed today with the patient including observation medication therapy including anticholinergics and Myrbetriq, deferring bladder Botox injections, InterStim and potential surgery to a consult with a urology surgeon. The patient has elected to proceed with:  - Behavior modification including moderation of fluids and avoiding bladder irritants   - Trial of Detrol 4 mg po q day indication of use and side effects discussed.     Follow up in 12 months to reassess, sooner as needed.         Marleni Longo CNP  (She/Hers)  [facility]

## 2024-11-21 NOTE — PATIENT INSTRUCTIONS
"Danial Mancuso, it was nice to meet you!    Thank you for allowing us the privilege of caring for you. We hope we provided you with the excellent service you deserve.   Please let us know if there is anything else we can do for you.  We want you to be completely satisfied with your care experience.    To ensure the quality of our services, you may be receiving a patient satisfaction survey from an independent patient satisfaction monitoring company.    The greatest compliment you can give is a \"Likely to Recommend.\"    Your visit was with OG Castro CNP today.    Instructions per today's visit:     Start Detrol take it in the morning   Moderate fluids       ___________________________________________________________________________  Important contact and scheduling information:  Please call our contact center at [phone] to schedule your next appointments or to reach our nurse triage line.  Please call during clinic hours Monday through Friday 8:00a - 4:30p if you have questions.  You can contact us anytime via [employer] and we will reply during clinic hours.    Lab results will be communicated through My Chart or letter (if My Chart not used). Please call the clinic if you have not received communication after 1 week or if you have any questions.?  __________________________________________________________________________    If labs were ordered today:    Please make an appointment to have them drawn at your convenience.     To schedule the Lab Appointment using [employer]:  Select \"Schedule an Appointment\"  Select \"Lab Only\"  Answer simple questions about where you would like to be seen and your type of insurance  For \"Which locations work for you?, select the location and set up the appointment.     List of Common Bladder Irritants  Alcoholic beverages  Apples and apple juice  Cantaloupe  Carbonated beverages  Chili and spicy foods  Chocolate  Citrus fruit  Coffee (including " decaffeinated)  Cranberries and cranberry juice  Grapes  Guava  Milk Products: milk, cheese, cottage cheese, yogurt, ice cream  Peaches  Pineapple  Plums  Strawberries  Sugar especially artificial sweeteners, saccharin, aspartame, corn sweeteners, honey, fructose, sucrose, lactose  Tea  Tomatoes and tomato juice  Vitamin B complex  Vinegar    Most people are not sensitive to ALL of these products; your goal is to find the foods that make YOUR symptoms worse    Try eliminating one or more of these foods from your diet and see if your symptoms improve. If your bladder symptoms are related to dietary factors, strict adherence to a diet that  eliminates the food should bring marked relief within 10 days. Once you are feeling better, you can begin to add foods back into your diet, one at a time. If symptoms return, you will be able to identify the irritant.

## 2024-11-26 DIAGNOSIS — E03.9 ACQUIRED HYPOTHYROIDISM: ICD-10-CM

## 2024-11-26 DIAGNOSIS — I10 HYPERTENSION, UNSPECIFIED TYPE: ICD-10-CM

## 2024-11-26 RX ORDER — LEVOTHYROXINE SODIUM 175 UG/1
175 TABLET ORAL DAILY
Qty: 90 TABLET | Refills: 3 | OUTPATIENT
Start: 2024-11-26

## 2024-11-26 RX ORDER — LOSARTAN POTASSIUM 25 MG/1
25 TABLET ORAL DAILY
Qty: 90 TABLET | Refills: 0 | Status: SHIPPED | OUTPATIENT
Start: 2024-11-26

## 2024-12-02 ENCOUNTER — MYC MEDICAL ADVICE (OUTPATIENT)
Dept: FAMILY MEDICINE | Facility: CLINIC | Age: 54
End: 2024-12-02
Payer: COMMERCIAL

## 2024-12-02 DIAGNOSIS — E03.9 HYPOTHYROIDISM, UNSPECIFIED TYPE: ICD-10-CM

## 2024-12-03 RX ORDER — LEVOTHYROXINE SODIUM 200 UG/1
200 TABLET ORAL DAILY
Qty: 30 TABLET | Refills: 0 | Status: SHIPPED | OUTPATIENT
Start: 2024-12-03

## 2024-12-03 NOTE — TELEPHONE ENCOUNTER
Left message to call back for: Patient  Information to relay to patient: Please see provider message below and assist with scheduling lab-only appointment

## 2024-12-03 NOTE — TELEPHONE ENCOUNTER
Spoke with patient. Provider message relayed. She reports that she will be due mid-December to end-December for lab recheck but is out of medication now as she has lost her dispense. Requesting bridge refill until her lab recheck is due. Please advise.

## 2024-12-31 ENCOUNTER — MYC REFILL (OUTPATIENT)
Dept: FAMILY MEDICINE | Facility: CLINIC | Age: 54
End: 2024-12-31
Payer: COMMERCIAL

## 2024-12-31 DIAGNOSIS — F32.5 MAJOR DEPRESSION IN REMISSION (H): ICD-10-CM

## 2024-12-31 DIAGNOSIS — F41.1 ANXIETY STATE: ICD-10-CM

## 2024-12-31 DIAGNOSIS — F51.01 PRIMARY INSOMNIA: ICD-10-CM

## 2024-12-31 DIAGNOSIS — I10 HYPERTENSION, UNSPECIFIED TYPE: ICD-10-CM

## 2024-12-31 RX ORDER — HYDROXYZINE PAMOATE 50 MG/1
100 CAPSULE ORAL AT BEDTIME
Qty: 180 CAPSULE | Refills: 0 | Status: SHIPPED | OUTPATIENT
Start: 2024-12-31

## 2024-12-31 RX ORDER — HYDROXYZINE PAMOATE 50 MG/1
100 CAPSULE ORAL AT BEDTIME
Qty: 180 CAPSULE | Refills: 0 | OUTPATIENT
Start: 2024-12-31

## 2024-12-31 RX ORDER — SERTRALINE HYDROCHLORIDE 100 MG/1
TABLET, FILM COATED ORAL
Qty: 135 TABLET | Refills: 0 | Status: SHIPPED | OUTPATIENT
Start: 2024-12-31

## 2024-12-31 RX ORDER — BUPROPION HYDROCHLORIDE 300 MG/1
300 TABLET ORAL EVERY MORNING
Qty: 90 TABLET | Refills: 0 | Status: SHIPPED | OUTPATIENT
Start: 2024-12-31

## 2024-12-31 RX ORDER — LOSARTAN POTASSIUM 25 MG/1
25 TABLET ORAL DAILY
Qty: 90 TABLET | Refills: 0 | Status: SHIPPED | OUTPATIENT
Start: 2024-12-31

## 2024-12-31 NOTE — TELEPHONE ENCOUNTER
This refill request is a duplicate request, previously received or sent.  Sent denial notification to pharmacy.    JUVENAL CoffmanN, RN  [facility]

## 2024-12-31 NOTE — TELEPHONE ENCOUNTER
Left message to call back for: Patient  Information to relay to patient: Please relay provider message below and help patient schedule.    MyChart message sent to patient.

## 2025-01-12 ENCOUNTER — HEALTH MAINTENANCE LETTER (OUTPATIENT)
Age: 55
End: 2025-01-12

## 2025-01-27 ENCOUNTER — LAB (OUTPATIENT)
Dept: LAB | Facility: CLINIC | Age: 55
End: 2025-01-27
Payer: COMMERCIAL

## 2025-01-27 DIAGNOSIS — E03.9 HYPOTHYROIDISM, UNSPECIFIED TYPE: ICD-10-CM

## 2025-01-27 LAB
T4 FREE SERPL-MCNC: 1.67 NG/DL (ref 0.9–1.7)
TSH SERPL DL<=0.005 MIU/L-ACNC: 0.1 UIU/ML (ref 0.3–4.2)

## 2025-01-27 PROCEDURE — 84439 ASSAY OF FREE THYROXINE: CPT

## 2025-01-27 PROCEDURE — 36415 COLL VENOUS BLD VENIPUNCTURE: CPT

## 2025-01-27 PROCEDURE — 84443 ASSAY THYROID STIM HORMONE: CPT

## 2025-01-28 DIAGNOSIS — E03.9 HYPOTHYROIDISM, UNSPECIFIED TYPE: ICD-10-CM

## 2025-01-28 DIAGNOSIS — E03.9 ACQUIRED HYPOTHYROIDISM: ICD-10-CM

## 2025-01-28 RX ORDER — LEVOTHYROXINE SODIUM 200 UG/1
TABLET ORAL
Qty: 90 TABLET | Refills: 0 | Status: SHIPPED | OUTPATIENT
Start: 2025-01-28

## 2025-01-28 RX ORDER — LEVOTHYROXINE SODIUM 175 UG/1
TABLET ORAL
Qty: 90 TABLET | Refills: 0 | Status: SHIPPED | OUTPATIENT
Start: 2025-01-28

## 2025-04-29 ENCOUNTER — OFFICE VISIT (OUTPATIENT)
Dept: FAMILY MEDICINE | Facility: CLINIC | Age: 55
End: 2025-04-29
Payer: COMMERCIAL

## 2025-04-29 VITALS
TEMPERATURE: 98.8 F | OXYGEN SATURATION: 97 % | RESPIRATION RATE: 16 BRPM | WEIGHT: 145.4 LBS | BODY MASS INDEX: 26.76 KG/M2 | SYSTOLIC BLOOD PRESSURE: 126 MMHG | HEIGHT: 62 IN | HEART RATE: 103 BPM | DIASTOLIC BLOOD PRESSURE: 87 MMHG

## 2025-04-29 DIAGNOSIS — N32.81 OVERACTIVE BLADDER: ICD-10-CM

## 2025-04-29 DIAGNOSIS — I10 HYPERTENSION, UNSPECIFIED TYPE: ICD-10-CM

## 2025-04-29 DIAGNOSIS — Z13.0 SCREENING, ANEMIA, DEFICIENCY, IRON: ICD-10-CM

## 2025-04-29 DIAGNOSIS — F41.1 ANXIETY STATE: ICD-10-CM

## 2025-04-29 DIAGNOSIS — F51.01 PRIMARY INSOMNIA: ICD-10-CM

## 2025-04-29 DIAGNOSIS — E66.3 OVERWEIGHT WITH BODY MASS INDEX (BMI) OF 26 TO 26.9 IN ADULT: ICD-10-CM

## 2025-04-29 DIAGNOSIS — Z13.220 SCREENING, LIPID: ICD-10-CM

## 2025-04-29 DIAGNOSIS — Z12.4 CERVICAL CANCER SCREENING: ICD-10-CM

## 2025-04-29 DIAGNOSIS — E03.9 HYPOTHYROIDISM, UNSPECIFIED TYPE: ICD-10-CM

## 2025-04-29 DIAGNOSIS — N95.1 MENOPAUSAL SYNDROME (HOT FLASHES): ICD-10-CM

## 2025-04-29 DIAGNOSIS — Z00.00 HEALTH CARE MAINTENANCE: ICD-10-CM

## 2025-04-29 DIAGNOSIS — Z00.00 ROUTINE GENERAL MEDICAL EXAMINATION AT A HEALTH CARE FACILITY: Primary | ICD-10-CM

## 2025-04-29 DIAGNOSIS — F32.5 MAJOR DEPRESSION IN REMISSION: ICD-10-CM

## 2025-04-29 PROBLEM — F33.1 MODERATE EPISODE OF RECURRENT MAJOR DEPRESSIVE DISORDER (H): Status: ACTIVE | Noted: 2023-04-28

## 2025-04-29 PROBLEM — Z78.0 MENOPAUSE: Status: ACTIVE | Noted: 2023-11-29

## 2025-04-29 LAB
ERYTHROCYTE [DISTWIDTH] IN BLOOD BY AUTOMATED COUNT: 13 % (ref 10–15)
HCT VFR BLD AUTO: 38 % (ref 35–47)
HGB BLD-MCNC: 12.5 G/DL (ref 11.7–15.7)
MCH RBC QN AUTO: 30.3 PG (ref 26.5–33)
MCHC RBC AUTO-ENTMCNC: 32.9 G/DL (ref 31.5–36.5)
MCV RBC AUTO: 92 FL (ref 78–100)
PLATELET # BLD AUTO: 401 10E3/UL (ref 150–450)
RBC # BLD AUTO: 4.12 10E6/UL (ref 3.8–5.2)
WBC # BLD AUTO: 8.5 10E3/UL (ref 4–11)

## 2025-04-29 PROCEDURE — 36415 COLL VENOUS BLD VENIPUNCTURE: CPT | Performed by: FAMILY MEDICINE

## 2025-04-29 PROCEDURE — 85027 COMPLETE CBC AUTOMATED: CPT | Performed by: FAMILY MEDICINE

## 2025-04-29 RX ORDER — SERTRALINE HYDROCHLORIDE 100 MG/1
150 TABLET, FILM COATED ORAL
Qty: 135 TABLET | Refills: 1 | Status: SHIPPED | OUTPATIENT
Start: 2025-04-29

## 2025-04-29 RX ORDER — BUPROPION HYDROCHLORIDE 300 MG/1
300 TABLET ORAL EVERY MORNING
Qty: 90 TABLET | Refills: 1 | Status: SHIPPED | OUTPATIENT
Start: 2025-04-29

## 2025-04-29 RX ORDER — TOLTERODINE 4 MG/1
4 CAPSULE, EXTENDED RELEASE ORAL DAILY
Qty: 90 CAPSULE | Refills: 3 | Status: SHIPPED | OUTPATIENT
Start: 2025-04-29

## 2025-04-29 RX ORDER — LOSARTAN POTASSIUM 25 MG/1
25 TABLET ORAL DAILY
Qty: 90 TABLET | Refills: 1 | Status: SHIPPED | OUTPATIENT
Start: 2025-04-29

## 2025-04-29 RX ORDER — ETONOGESTREL AND ETHINYL ESTRADIOL VAGINAL RING .015; .12 MG/D; MG/D
RING VAGINAL
Qty: 3 EACH | Refills: 3 | Status: CANCELLED | OUTPATIENT
Start: 2025-04-29

## 2025-04-29 RX ORDER — HYDROXYZINE PAMOATE 50 MG/1
100 CAPSULE ORAL AT BEDTIME
Qty: 180 CAPSULE | Refills: 0 | Status: SHIPPED | OUTPATIENT
Start: 2025-04-29

## 2025-04-29 SDOH — HEALTH STABILITY: PHYSICAL HEALTH: ON AVERAGE, HOW MANY DAYS PER WEEK DO YOU ENGAGE IN MODERATE TO STRENUOUS EXERCISE (LIKE A BRISK WALK)?: 2 DAYS

## 2025-04-29 ASSESSMENT — ANXIETY QUESTIONNAIRES
5. BEING SO RESTLESS THAT IT IS HARD TO SIT STILL: MORE THAN HALF THE DAYS
GAD7 TOTAL SCORE: 7
1. FEELING NERVOUS, ANXIOUS, OR ON EDGE: SEVERAL DAYS
GAD7 TOTAL SCORE: 7
GAD7 TOTAL SCORE: 7
7. FEELING AFRAID AS IF SOMETHING AWFUL MIGHT HAPPEN: NOT AT ALL
2. NOT BEING ABLE TO STOP OR CONTROL WORRYING: SEVERAL DAYS
7. FEELING AFRAID AS IF SOMETHING AWFUL MIGHT HAPPEN: NOT AT ALL
8. IF YOU CHECKED OFF ANY PROBLEMS, HOW DIFFICULT HAVE THESE MADE IT FOR YOU TO DO YOUR WORK, TAKE CARE OF THINGS AT HOME, OR GET ALONG WITH OTHER PEOPLE?: SOMEWHAT DIFFICULT
6. BECOMING EASILY ANNOYED OR IRRITABLE: SEVERAL DAYS
IF YOU CHECKED OFF ANY PROBLEMS ON THIS QUESTIONNAIRE, HOW DIFFICULT HAVE THESE PROBLEMS MADE IT FOR YOU TO DO YOUR WORK, TAKE CARE OF THINGS AT HOME, OR GET ALONG WITH OTHER PEOPLE: SOMEWHAT DIFFICULT
4. TROUBLE RELAXING: SEVERAL DAYS
3. WORRYING TOO MUCH ABOUT DIFFERENT THINGS: SEVERAL DAYS

## 2025-04-29 ASSESSMENT — SOCIAL DETERMINANTS OF HEALTH (SDOH): HOW OFTEN DO YOU GET TOGETHER WITH FRIENDS OR RELATIVES?: ONCE A WEEK

## 2025-04-29 ASSESSMENT — PATIENT HEALTH QUESTIONNAIRE - PHQ9
SUM OF ALL RESPONSES TO PHQ QUESTIONS 1-9: 9
10. IF YOU CHECKED OFF ANY PROBLEMS, HOW DIFFICULT HAVE THESE PROBLEMS MADE IT FOR YOU TO DO YOUR WORK, TAKE CARE OF THINGS AT HOME, OR GET ALONG WITH OTHER PEOPLE: SOMEWHAT DIFFICULT
SUM OF ALL RESPONSES TO PHQ QUESTIONS 1-9: 9

## 2025-04-29 NOTE — PROGRESS NOTES
Preventive Care Visit  [facility]  Zora Wright MD, Family Medicine  Apr 29, 2025        In addition to the preventive service, I spent 30 minutes discussing the patient's medication management     Assessment & Plan  Routine general medical examination at a health care facility         Health care maintenance    Contraception  - post menopause  Recommended vaccines:  pneumo, tdap, zoster, covid  Pap:  nl pap and neg hpv 12/2021 - repeat 2026  Mammo:  normal 11/2024  Colonoscopy:  normal 2022 repeat 2032   Std testing desired:  offered  Osteoporosis prevention discussed. Recommend daily calcium and vitamin d intake to keep good bone health. Recommend weight bearing exercise, no tobacco, and limit caffeine and alcohol  dexa - ordered.   Recommend sunscreen, exercise, & healthy diet.   Offered cbc, cmp, lipids and asked what other testing she  desires today   Fasting: no  I have had an Advance Directives discussion with the patient.   Body mass index is 26.59 kg/m .   mychart active.          Hypothyroidism, unspecified type  Currently taking levothyroxine 200 mcg orally per day.  Last TSH was abnormal in January 2025-will recheck.  Titrate medication as needed.  Orders:    TSH with free T4 reflex; Future    Overactive bladder  Refilled detrol LA 4mg po q day  Orders:    tolterodine ER (DETROL LA) 4 MG 24 hr capsule; Take 1 capsule (4 mg) by mouth daily.    Hypertension, unspecified type  Hypertension-current blood pressure is 143/84-borderline control.  Continue losartan 25 mg p.o. daily.  Would recommend monitoring and increasing losartan to 50 mg p.o. daily if not controlled.    HTN  Patient is asked to monitor BP at home or work, several times per month and return with written values at next office visit. Goal bp is 120/80. If staying higher than 130/85 on three occasions you should bring the values into clinic so that we can evaluate and treat if needed.    Recommend stop alcohol,  caffiene, ibuprofen, carbonated drinks, sudafed & decrease salt intake. If you smoke, it is recommended that you quit. Keep weight in normal range.    Walk daily for at least 30 minutes.    Orders:    Comprehensive metabolic panel (BMP + Alb, Alk Phos, ALT, AST, Total. Bili, TP); Future    losartan (COZAAR) 25 MG tablet; Take 1 tablet (25 mg) by mouth daily.    Major depression in remission  depression, anxiety, and insomnia  Zoloft 150 mg p.o. daily-refilled   Wellbutin 300 mg XL 24-hour tab-refilled   Hydroxyzine 100 mg p.o. nightly, melatonin 5mg (will try to get one from vitamin store), and magnesium 400mg po q hs. -can refill as needed  Sleep psychologist referral given.   Collaborative psych visit also discussed and desired, so order placed.   Orders:    buPROPion (WELLBUTRIN XL) 300 MG 24 hr tablet; Take 1 tablet (300 mg) by mouth every morning.    Anxiety state  depression, anxiety, and insomnia  Zoloft 150 mg p.o. daily-refilled   Wellbutin 300 mg XL 24-hour tab-refilled   Hydroxyzine 100 mg p.o. nightly, melatonin 5mg (will try to get one from vitamin store), and magnesium 400mg po q hs. -can refill as needed  Sleep psychologist referral given.   Collaborative psych visit also discussed and desired, so order placed.   Orders:    Adult Mental Health  Referral; Future    Adult Mental Health  Referral; Future    sertraline (ZOLOFT) 100 MG tablet; Take 1.5 tablets (150 mg) by mouth daily at 2 pm.    Primary insomnia  depression, anxiety, and insomnia  Zoloft 150 mg p.o. daily-refilled   Wellbutin 300 mg XL 24-hour tab-refilled   Hydroxyzine 100 mg p.o. nightly, melatonin 5mg (will try to get one from vitamin store), and magnesium 400mg po q hs. -can refill as needed  Sleep psychologist referral given.   Collaborative psych visit also discussed and desired, so order placed.   Orders:    Adult Mental Health  Referral; Future    Adult Mental Health  Referral; Future     hydrOXYzine (VISTARIL) 50 MG capsule; Take 2 capsules (100 mg) by mouth at bedtime.    Menopausal syndrome (hot flashes)  Menopause - saw gynecologist in the past - referral given back to gyn.   She is using nuvaring and prometrium.   Orders:    Ob/Gyn  Referral; Future    DX Bone Density; Future    Screening, lipid    Orders:    Lipid Profile; Future    Screening, anemia, deficiency, iron    Orders:    CBC with platelets; Future    Cervical cancer screening         Overweight with body mass index (BMI) of 26 to 26.9 in adult  Health lifestyle discussed.                     Subjective   Gloria is a 54 year old, presenting for the following:  Physical        4/29/2025     2:44 PM   Additional Questions   Roomed by as   Accompanied by self         4/29/2025     2:44 PM   Patient Reported Additional Medications   Patient reports taking the following new medications no          Occasional refills including Zoloft, Wellbutrin, hydroxyzine, levothyroxine, Prometrium, GINO ring, and Detrol  Advance Care Planning  Discussed advance care planning with patient; informed AVS has link to [employer].        4/29/2025   General Health   How would you rate your overall physical health? (!) FAIR   Feel stress (tense, anxious, or unable to sleep) To some extent   (!) STRESS CONCERN - discussed      4/29/2025   Nutrition   Three or more servings of calcium each day? Yes   Diet: Carbohydrate counting   How many servings of fruit and vegetables per day? (!) 2-3  - info added to avs   How many sweetened beverages each day? 0-1         4/29/2025   Exercise   Days per week of moderate/strenous exercise 2 days   (!) EXERCISE CONCERN      4/29/2025   Social Factors   Frequency of gathering with friends or relatives Once a week   Worry food won't last until get money to buy more No   Food not last or not have enough money for food? No   Do you have housing? (Housing is defined as stable permanent housing and does not include  staying outside in a car, in a tent, in an abandoned building, in an overnight shelter, or couch-surfing.) Yes   Are you worried about losing your housing? No   Lack of transportation? No   Unable to get utilities (heat,electricity)? No         4/29/2025   Fall Risk   Fallen 2 or more times in the past year? No   Trouble with walking or balance? No          4/29/2025   Dental   Dentist two times every year? Yes       Today's PHQ-9 Score:       4/29/2025     2:39 PM   PHQ-9 SCORE   PHQ-9 Total Score MyChart 9 (Mild depression)   PHQ-9 Total Score 9        Patient-reported         4/29/2025   Substance Use   Alcohol more than 3/day or more than 7/wk No   Do you use any other substances recreationally? (!) PRESCRIPTION DRUGS     Social History     Tobacco Use    Smoking status: Never     Passive exposure: Never    Smokeless tobacco: Never   Vaping Use    Vaping status: Never Used   Substance Use Topics    Alcohol use: Yes     Alcohol/week: 4.0 standard drinks of alcohol     Comment: Alcoholic Drinks/day: occ    Drug use: No           11/5/2024   LAST FHS-7 RESULTS   1st degree relative breast or ovarian cancer No   Any relative bilateral breast cancer No   Any male have breast cancer No   Any ONE woman have BOTH breast AND ovarian cancer No   Any woman with breast cancer before 50yrs No   2 or more relatives with breast AND/OR ovarian cancer No   2 or more relatives with breast AND/OR bowel cancer No       Mammogram Screening - Mammogram every 1-2 years updated in Health Maintenance based on mutual decision making        4/29/2025   STI Screening   New sexual partner(s) since last STI/HIV test? No     History of abnormal Pap smear: No - age 30- 64 PAP with HPV every 5 years recommended        Latest Ref Rng & Units 12/22/2021    10:53 AM 4/26/2018    10:17 AM   PAP / HPV   PAP  Negative for Intraepithelial Lesion or Malignancy (NILM)  Atypical squamous cells of undetermined significance  Electronically signed by  "Cyn Chino MD on 5/2/2018 at  4:24 PM      HPV 16 DNA Negative Negative  Negative    HPV 18 DNA Negative Negative  Negative    Other HR HPV Negative Negative  Negative      ASCVD Risk   The 10-year ASCVD risk score (Gianni CRAMER, et al., 2019) is: 1.3%    Values used to calculate the score:      Age: 54 years      Sex: Female      Is Non- : No      Diabetic: No      Tobacco smoker: No      Systolic Blood Pressure: 126 mmHg      Is BP treated: Yes      HDL Cholesterol: 79 mg/dL      Total Cholesterol: 204 mg/dL        Reviewed and updated as needed this visit by Provider   Tobacco  Allergies  Meds  Problems  Med Hx  Surg Hx  Fam Hx               Objective    Exam  /87 (BP Location: Left arm, Patient Position: Left side, Cuff Size: Adult Large)   Pulse 103   Temp 98.8  F (37.1  C) (Oral)   Resp 16   Ht 1.575 m (5' 2\")   Wt 66 kg (145 lb 6.4 oz)   LMP  (LMP Unknown)   SpO2 97%   BMI 26.59 kg/m     Estimated body mass index is 26.59 kg/m  as calculated from the following:    Height as of this encounter: 1.575 m (5' 2\").    Weight as of this encounter: 66 kg (145 lb 6.4 oz).    Physical Exam  Constitutional:       Appearance: Normal appearance.   HENT:      Head: Normocephalic and atraumatic.   Cardiovascular:      Rate and Rhythm: Normal rate and regular rhythm.      Heart sounds: Normal heart sounds.   Pulmonary:      Effort: Pulmonary effort is normal.      Breath sounds: Normal breath sounds.   Abdominal:      General: Bowel sounds are normal.      Palpations: Abdomen is soft.   Musculoskeletal:         General: Normal range of motion.      Cervical back: Normal range of motion and neck supple.   Neurological:      General: No focal deficit present.      Mental Status: She is alert and oriented to person, place, and time.               Signed Electronically by: Zora Wright MD    Answers submitted by the patient for this visit:  Patient Health " Questionnaire (Submitted on 4/29/2025)  If you checked off any problems, how difficult have these problems made it for you to do your work, take care of things at home, or get along with other people?: Somewhat difficult  PHQ9 TOTAL SCORE: 9  Patient Health Questionnaire (G7) (Submitted on 4/29/2025)  GIOVANNI 7 TOTAL SCORE: 7

## 2025-04-29 NOTE — ASSESSMENT & PLAN NOTE
Menopause - saw gynecologist in the past - referral given back to gyn.   She is using nuvaring and prometrium.   Orders:    Ob/Gyn  Referral; Future    DX Bone Density; Future

## 2025-04-29 NOTE — ASSESSMENT & PLAN NOTE
Currently taking levothyroxine 200 mcg orally per day.  Last TSH was abnormal in January 2025-will recheck.  Titrate medication as needed.  Orders:    TSH with free T4 reflex; Future

## 2025-04-29 NOTE — ASSESSMENT & PLAN NOTE
depression, anxiety, and insomnia  Zoloft 150 mg p.o. daily-refilled   Wellbutin 300 mg XL 24-hour tab-refilled   Hydroxyzine 100 mg p.o. nightly, melatonin 5mg (will try to get one from vitamin store), and magnesium 400mg po q hs. -can refill as needed  Sleep psychologist referral given.   Collaborative psych visit also discussed and desired, so order placed.   Orders:    Adult Mental Health  Referral; Future    Adult Mental Health  Referral; Future    sertraline (ZOLOFT) 100 MG tablet; Take 1.5 tablets (150 mg) by mouth daily at 2 pm.

## 2025-04-29 NOTE — ASSESSMENT & PLAN NOTE
Hypertension-current blood pressure is 143/84-borderline control.  Continue losartan 25 mg p.o. daily.  Would recommend monitoring and increasing losartan to 50 mg p.o. daily if not controlled.    HTN  Patient is asked to monitor BP at home or work, several times per month and return with written values at next office visit. Goal bp is 120/80. If staying higher than 130/85 on three occasions you should bring the values into clinic so that we can evaluate and treat if needed.    Recommend stop alcohol, caffiene, ibuprofen, carbonated drinks, sudafed & decrease salt intake. If you smoke, it is recommended that you quit. Keep weight in normal range.    Walk daily for at least 30 minutes.

## 2025-04-29 NOTE — ASSESSMENT & PLAN NOTE
Hypertension-current blood pressure is 143/84-borderline control.  Continue losartan 25 mg p.o. daily.  Would recommend monitoring and increasing losartan to 50 mg p.o. daily if not controlled.    HTN  Patient is asked to monitor BP at home or work, several times per month and return with written values at next office visit. Goal bp is 120/80. If staying higher than 130/85 on three occasions you should bring the values into clinic so that we can evaluate and treat if needed.    Recommend stop alcohol, caffiene, ibuprofen, carbonated drinks, sudafed & decrease salt intake. If you smoke, it is recommended that you quit. Keep weight in normal range.    Walk daily for at least 30 minutes.    Orders:    Comprehensive metabolic panel (BMP + Alb, Alk Phos, ALT, AST, Total. Bili, TP); Future    losartan (COZAAR) 25 MG tablet; Take 1 tablet (25 mg) by mouth daily.

## 2025-04-29 NOTE — ASSESSMENT & PLAN NOTE
depression, anxiety, and insomnia  Zoloft 150 mg p.o. daily-refilled   Wellbutin 300 mg XL 24-hour tab-refilled   Hydroxyzine 100 mg p.o. nightly, melatonin 5mg (will try to get one from vitamin store), and magnesium 400mg po q hs. -can refill as needed  Sleep psychologist referral given.   Collaborative psych visit also discussed and desired, so order placed.   Orders:    Adult Mental Health  Referral; Future    Adult Mental Health  Referral; Future    hydrOXYzine (VISTARIL) 50 MG capsule; Take 2 capsules (100 mg) by mouth at bedtime.

## 2025-04-29 NOTE — PATIENT INSTRUCTIONS
Patient Education   Preventive Care Advice   This is general advice given by our system to help you stay healthy. However, your care team may have specific advice just for you. Please talk to your care team about your preventive care needs.  Nutrition  Eat 5 or more servings of fruits and vegetables each day.  Try wheat bread, brown rice and whole grain pasta (instead of white bread, rice, and pasta).  Get enough calcium and vitamin D. Check the label on foods and aim for 100% of the RDA (recommended daily allowance).  Lifestyle  Exercise at least 150 minutes each week  (30 minutes a day, 5 days a week).  Do muscle strengthening activities 2 days a week. These help control your weight and prevent disease.  No smoking.  Wear sunscreen to prevent skin cancer.  Have a dental exam and cleaning every 6 months.  Yearly exams  See your health care team every year to talk about:  Any changes in your health.  Any medicines your care team has prescribed.  Preventive care, family planning, and ways to prevent chronic diseases.  Shots (vaccines)   HPV shots (up to age 26), if you've never had them before.  Hepatitis B shots (up to age 59), if you've never had them before.  COVID-19 shot: Get this shot when it's due.  Flu shot: Get a flu shot every year.  Tetanus shot: Get a tetanus shot every 10 years.  Pneumococcal, hepatitis A, and RSV shots: Ask your care team if you need these based on your risk.  Shingles shot (for age 50 and up)  General health tests  Diabetes screening:  Starting at age 35, Get screened for diabetes at least every 3 years.  If you are younger than age 35, ask your care team if you should be screened for diabetes.  Cholesterol test: At age 39, start having a cholesterol test every 5 years, or more often if advised.  Bone density scan (DEXA): At age 50, ask your care team if you should have this scan for osteoporosis (brittle bones).  Hepatitis C: Get tested at least once in your life.  STIs (sexually  transmitted infections)  Before age 24: Ask your care team if you should be screened for STIs.  After age 24: Get screened for STIs if you're at risk. You are at risk for STIs (including HIV) if:  You are sexually active with more than one person.  You don't use condoms every time.  You or a partner was diagnosed with a sexually transmitted infection.  If you are at risk for HIV, ask about PrEP medicine to prevent HIV.  Get tested for HIV at least once in your life, whether you are at risk for HIV or not.  Cancer screening tests  Cervical cancer screening: If you have a cervix, begin getting regular cervical cancer screening tests starting at age 21.  Breast cancer scan (mammogram): If you've ever had breasts, begin having regular mammograms starting at age 40. This is a scan to check for breast cancer.  Colon cancer screening: It is important to start screening for colon cancer at age 45.  Have a colonoscopy test every 10 years (or more often if you're at risk) Or, ask your provider about stool tests like a FIT test every year or Cologuard test every 3 years.  To learn more about your testing options, visit:   .  For help making a decision, visit:   https://bit.ly/rl63401.  Prostate cancer screening test: If you have a prostate, ask your care team if a prostate cancer screening test (PSA) at age 55 is right for you.  Lung cancer screening: If you are a current or former smoker ages 50 to 80, ask your care team if ongoing lung cancer screenings are right for you.  For informational purposes only. Not to replace the advice of your health care provider. Copyright   2023 [facility]. All rights reserved. Clinically reviewed by the [facility] Transitions Program. [employer] 575586 - REV 01/24.  Learning About Stress  What is stress?     Stress is your body's response to a hard situation. Your body can have a physical, emotional, or mental response. Stress is a fact of life for most people, and it  affects everyone differently. What causes stress for you may not be stressful for someone else.  A lot of things can cause stress. You may feel stress when you go on a job interview, take a test, or run a race. This kind of short-term stress is normal and even useful. It can help you if you need to work hard or react quickly. For example, stress can help you finish an important job on time.  Long-term stress is caused by ongoing stressful situations or events. Examples of long-term stress include long-term health problems, ongoing problems at work, or conflicts in your family. Long-term stress can harm your health.  How does stress affect your health?  When you are stressed, your body responds as though you are in danger. It makes hormones that speed up your heart, make you breathe faster, and give you a burst of energy. This is called the fight-or-flight stress response. If the stress is over quickly, your body goes back to normal and no harm is done.  But if stress happens too often or lasts too long, it can have bad effects. Long-term stress can make you more likely to get sick, and it can make symptoms of some diseases worse. If you tense up when you are stressed, you may develop neck, shoulder, or low back pain. Stress is linked to high blood pressure and heart disease.  Stress also harms your emotional health. It can make you uribe, tense, or depressed. Your relationships may suffer, and you may not do well at work or school.  What can you do to manage stress?  You can try these things to help manage stress:   Do something active. Exercise or activity can help reduce stress. Walking is a great way to get started. Even everyday activities such as housecleaning or yard work can help.  Try yoga or morelia chi. These techniques combine exercise and meditation. You may need some training at first to learn them.  Do something you enjoy. For example, listen to music or go to a movie. Practice your hobby or do volunteer  "work.  Meditate. This can help you relax, because you are not worrying about what happened before or what may happen in the future.  Do guided imagery. Imagine yourself in any setting that helps you feel calm. You can use online videos, books, or a teacher to guide you.  Do breathing exercises. For example:  From a standing position, bend forward from the waist with your knees slightly bent. Let your arms dangle close to the floor.  Breathe in slowly and deeply as you return to a standing position. Roll up slowly and lift your head last.  Hold your breath for just a few seconds in the standing position.  Breathe out slowly and bend forward from the waist.  Let your feelings out. Talk, laugh, cry, and express anger when you need to. Talking with supportive friends or family, a counselor, or a clarisa leader about your feelings is a healthy way to relieve stress. Avoid discussing your feelings with people who make you feel worse.  Write. It may help to write about things that are bothering you. This helps you find out how much stress you feel and what is causing it. When you know this, you can find better ways to cope.  What can you do to prevent stress?  You might try some of these things to help prevent stress:  Manage your time. This helps you find time to do the things you want and need to do.  Get enough sleep. Your body recovers from the stresses of the day while you are sleeping.  Get support. Your family, friends, and community can make a difference in how you experience stress.  Limit your news feed. Avoid or limit time on social media or news that may make you feel stressed.  Do something active. Exercise or activity can help reduce stress. Walking is a great way to get started.  Where can you learn more?  Go to https://www.[employer].net/patiented  Enter N032 in the search box to learn more about \"Learning About Stress.\"  Current as of: October 24, 2024  Content Version: 14.4 2024-2025 [employer].   Care instructions adapted under license by your healthcare professional. If you have questions about a medical condition or this instruction, always ask your healthcare professional. [employer] disclaims any warranty or liability for your use of this information.    Learning About Depression Screening  What is depression screening?  Depression screening is a way to see if you have depression symptoms. It may be done by a doctor or counselor. It's often part of a routine checkup. That's because your mental health is just as important as your physical health.  Depression is a mental health condition that affects how you feel, think, and act. You may:  Have less energy.  Lose interest in your daily activities.  Feel sad and grouchy for a long time.  Depression is very common. It affects people of all ages.  Many things can lead to depression. Some people become depressed after they have a stroke or find out they have a major illness like cancer or heart disease. The death of a loved one or a breakup may lead to depression. It can run in families. Most experts believe that a combination of inherited genes and stressful life events can cause it.  What happens during screening?  You may be asked to fill out a form about your depression symptoms. You and the doctor will discuss your answers. The doctor may ask you more questions to learn more about how you think, act, and feel.  What happens after screening?  If you have symptoms of depression, your doctor will talk to you about your options.  Doctors usually treat depression with medicines or counseling. Often, combining the two works best. Many people don't get help because they think that they'll get over the depression on their own. But people with depression may not get better unless they get treatment.  The cause of depression is not well understood. There may be many factors involved. But if you have depression, it's not your fault.  A serious  "symptom of depression is thinking about death or suicide. If you or someone you care about talks about this or about feeling hopeless, get help right away.  It's important to know that depression can be treated. Medicine, counseling, and self-care may help.  Where can you learn more?  Go to https://www.[employer].net/patiented  Enter T185 in the search box to learn more about \"Learning About Depression Screening.\"  Current as of: July 31, 2024  Content Version: 14.4    9978-4002 [employer].   Care instructions adapted under license by your healthcare professional. If you have questions about a medical condition or this instruction, always ask your healthcare professional. [employer] disclaims any warranty or liability for your use of this information.    Substance Use Disorder: Care Instructions  Overview     You can improve your life and health by stopping your use of alcohol or drugs. When you don't drink or use drugs, you may feel and sleep better. You may get along better with your family, friends, and coworkers. There are medicines and programs that can help with substance use disorder.  How can you care for yourself at home?  Here are some ways to help you stay sober and prevent relapse.  If you have been given medicine to help keep you sober or reduce your cravings, be sure to take it exactly as prescribed.  Talk to your doctor about programs that can help you stop using drugs or drinking alcohol.  Do not keep alcohol or drugs in your home.  Plan ahead. Think about what you'll say if other people ask you to drink or use drugs. Try not to spend time with people who drink or use drugs.  Use the time and money spent on drinking or drugs to do something that's important to you.  Preventing a relapse  Have a plan to deal with relapse. Learn to recognize changes in your thinking that lead you to drink or use drugs. Get help before you start to drink or use drugs again.  Try to stay away from " situations, friends, or places that may lead you to drink or use drugs.  If you feel the need to drink alcohol or use drugs again, seek help right away. Call a trusted friend or family member. Some people get support from organizations such as Narcotics Anonymous or [employer] or from treatment facilities.  If you relapse, get help as soon as you can. Some people make a plan with another person that outlines what they want that person to do for them if they relapse. The plan usually includes how to handle the relapse and who to notify in case of relapse.  Don't give up. Remember that a relapse doesn't mean that you have failed. Use the experience to learn the triggers that lead you to drink or use drugs. Then quit again. Recovery is a lifelong process. Many people have several relapses before they are able to quit for good.  Follow-up care is a key part of your treatment and safety. Be sure to make and go to all appointments, and call your doctor if you are having problems. It's also a good idea to know your test results and keep a list of the medicines you take.  When should you call for help?   Call 521  anytime you think you may need emergency care. For example, call if you or someone else:    Has overdosed or has withdrawal signs. Be sure to tell the emergency workers that you are or someone else is using or trying to quit using drugs. Overdose or withdrawal signs may include:  Losing consciousness.  Seizure.  Seeing or hearing things that aren't there (hallucinations).     Is thinking or talking about suicide or harming others.   Where to get help 24 hours a day, 7 days a week   If you or someone you know talks about suicide, self-harm, a mental health crisis, a substance use crisis, or any other kind of emotional distress, get help right away. You can:    Call the Suicide and Crisis Lifeline at 548.     Call 7-937-870-TALK (1-130.233.5675).     Text HOME to 265130 to access the Crisis Text Line.   Consider  "saving these numbers in your phone.  Go to [employer] for more information or to chat online.  Call your doctor now or seek immediate medical care if:    You are having withdrawal symptoms. These may include nausea or vomiting, sweating, shakiness, and anxiety.   Watch closely for changes in your health, and be sure to contact your doctor if:    You have a relapse.     You need more help or support to stop.   Where can you learn more?  Go to https://www.[employer].net/patiented  Enter H573 in the search box to learn more about \"Substance Use Disorder: Care Instructions.\"  Current as of: August 20, 2024  Content Version: 14.4    5911-3185 [employer].   Care instructions adapted under license by your healthcare professional. If you have questions about a medical condition or this instruction, always ask your healthcare professional. [employer] disclaims any warranty or liability for your use of this information.       "

## 2025-04-29 NOTE — ASSESSMENT & PLAN NOTE
Refilled detrol LA 4mg po q day  Orders:    tolterodine ER (DETROL LA) 4 MG 24 hr capsule; Take 1 capsule (4 mg) by mouth daily.

## 2025-04-29 NOTE — ASSESSMENT & PLAN NOTE
Contraception  - post menopause  Recommended vaccines:  pneumo, tdap, zoster, covid  Pap:  nl pap and neg hpv 12/2021 - repeat 2026  Mammo:  normal 11/2024  Colonoscopy:  normal 2022 repeat 2032   Std testing desired:  offered  Osteoporosis prevention discussed. Recommend daily calcium and vitamin d intake to keep good bone health. Recommend weight bearing exercise, no tobacco, and limit caffeine and alcohol  dexa - ordered.   Recommend sunscreen, exercise, & healthy diet.   Offered cbc, cmp, lipids and asked what other testing she  desires today   Fasting: no  I have had an Advance Directives discussion with the patient.   Body mass index is 26.59 kg/m .   mychart active.

## 2025-04-30 ENCOUNTER — PATIENT OUTREACH (OUTPATIENT)
Dept: CARE COORDINATION | Facility: CLINIC | Age: 55
End: 2025-04-30
Payer: COMMERCIAL

## 2025-04-30 LAB
ALBUMIN SERPL BCG-MCNC: 4.1 G/DL (ref 3.5–5.2)
ALP SERPL-CCNC: 70 U/L (ref 40–150)
ALT SERPL W P-5'-P-CCNC: 14 U/L (ref 0–50)
ANION GAP SERPL CALCULATED.3IONS-SCNC: 10 MMOL/L (ref 7–15)
AST SERPL W P-5'-P-CCNC: 21 U/L (ref 0–45)
BILIRUB SERPL-MCNC: <0.2 MG/DL
BUN SERPL-MCNC: 8.2 MG/DL (ref 6–20)
CALCIUM SERPL-MCNC: 9.3 MG/DL (ref 8.8–10.4)
CHLORIDE SERPL-SCNC: 105 MMOL/L (ref 98–107)
CHOLEST SERPL-MCNC: 264 MG/DL
CREAT SERPL-MCNC: 0.82 MG/DL (ref 0.51–0.95)
EGFRCR SERPLBLD CKD-EPI 2021: 86 ML/MIN/1.73M2
FASTING STATUS PATIENT QL REPORTED: NO
FASTING STATUS PATIENT QL REPORTED: NO
GLUCOSE SERPL-MCNC: 101 MG/DL (ref 70–99)
HCO3 SERPL-SCNC: 25 MMOL/L (ref 22–29)
HDLC SERPL-MCNC: 86 MG/DL
LDLC SERPL CALC-MCNC: 140 MG/DL
NONHDLC SERPL-MCNC: 178 MG/DL
POTASSIUM SERPL-SCNC: 4 MMOL/L (ref 3.4–5.3)
PROT SERPL-MCNC: 7.2 G/DL (ref 6.4–8.3)
SODIUM SERPL-SCNC: 140 MMOL/L (ref 135–145)
TRIGL SERPL-MCNC: 190 MG/DL
TSH SERPL DL<=0.005 MIU/L-ACNC: 0.36 UIU/ML (ref 0.3–4.2)

## 2025-05-07 ENCOUNTER — MYC MEDICAL ADVICE (OUTPATIENT)
Dept: FAMILY MEDICINE | Facility: CLINIC | Age: 55
End: 2025-05-07
Payer: COMMERCIAL

## 2025-05-14 ENCOUNTER — RESULTS FOLLOW-UP (OUTPATIENT)
Dept: FAMILY MEDICINE | Facility: CLINIC | Age: 55
End: 2025-05-14

## 2025-06-02 ENCOUNTER — MYC MEDICAL ADVICE (OUTPATIENT)
Dept: FAMILY MEDICINE | Facility: CLINIC | Age: 55
End: 2025-06-02
Payer: COMMERCIAL

## 2025-06-02 DIAGNOSIS — E03.9 HYPOTHYROIDISM, UNSPECIFIED TYPE: ICD-10-CM

## 2025-06-02 NOTE — TELEPHONE ENCOUNTER
Patient is requesting prescription.   Last OV: 5/9/2025.  Please review and approve if appropriate.      01/28/2025   levothyroxine (SYNTHROID/LEVOTHROID) 200 MCG tablet  #90    TSH   Date Value Ref Range Status   04/29/2025 0.36 0.30 - 4.20 uIU/mL Final   06/13/2022 2.32 0.30 - 5.00 uIU/mL Final   10/07/2021 1.23 0.40 - 4.00 mU/L Final

## 2025-06-03 RX ORDER — LEVOTHYROXINE SODIUM 200 UG/1
TABLET ORAL
Qty: 90 TABLET | Refills: 0 | Status: SHIPPED | OUTPATIENT
Start: 2025-06-03

## 2025-06-09 ENCOUNTER — OFFICE VISIT (OUTPATIENT)
Dept: MIDWIFE SERVICES | Facility: CLINIC | Age: 55
End: 2025-06-09
Attending: FAMILY MEDICINE
Payer: COMMERCIAL

## 2025-06-09 VITALS
BODY MASS INDEX: 26.59 KG/M2 | SYSTOLIC BLOOD PRESSURE: 139 MMHG | OXYGEN SATURATION: 98 % | HEART RATE: 87 BPM | DIASTOLIC BLOOD PRESSURE: 91 MMHG | HEIGHT: 63 IN | WEIGHT: 150.1 LBS

## 2025-06-09 DIAGNOSIS — G47.00 INSOMNIA, UNSPECIFIED TYPE: ICD-10-CM

## 2025-06-09 DIAGNOSIS — Z78.0 MENOPAUSE PRESENT: ICD-10-CM

## 2025-06-09 DIAGNOSIS — N95.1 MENOPAUSAL SYNDROME (HOT FLASHES): ICD-10-CM

## 2025-06-09 DIAGNOSIS — Z12.4 PAP SMEAR FOR CERVICAL CANCER SCREENING: Primary | ICD-10-CM

## 2025-06-09 RX ORDER — PROGESTERONE 100 MG/1
100 CAPSULE ORAL DAILY
Qty: 90 CAPSULE | Refills: 3 | Status: SHIPPED | OUTPATIENT
Start: 2025-06-09

## 2025-06-09 RX ORDER — ESTRADIOL 0.05 MG/D
1 PATCH, EXTENDED RELEASE TRANSDERMAL
Qty: 24 PATCH | Refills: 3 | Status: SHIPPED | OUTPATIENT
Start: 2025-06-09

## 2025-06-09 RX ORDER — PROGESTERONE 100 MG/1
100 CAPSULE ORAL AT BEDTIME
Qty: 90 CAPSULE | Refills: 4 | OUTPATIENT
Start: 2025-06-09

## 2025-06-09 NOTE — TELEPHONE ENCOUNTER
Requested Prescriptions   Pending Prescriptions Disp Refills    progesterone (PROMETRIUM) 100 MG capsule [Pharmacy Med Name: PROGESTERONE 100 MG CAPSULE] 90 capsule 4     Sig: TAKE 1 CAPSULE BY MOUTH AT BEDTIME       Hormone Replacement Therapy Failed - 6/9/2025  4:22 PM        Failed - Most recent blood pressure under 140/90 in past 12 months     BP Readings from Last 3 Encounters:   06/09/25 (!) 139/91   04/29/25 126/87   11/21/24 129/87       No data recorded            Failed - Recent (12 month) or future (90 days) visit with authorizing provider's specialty (provided they have been seen in the past 15 months)     The patient must have completed an in-person or virtual visit within the past 12 months or has a future visit scheduled within the next 90 days with the authorizing provider s specialty.  Urgent care and e-visits do not qualify as an office visit for this protocol.          Failed - Medication indicated for associated diagnosis     The medication is prescribed for one or more of the following conditions:    Menopause   Vulva/Vaginal atrophy   Low Estrogen   Gender Dysphoria   Male to Female transgender          Passed - Medication is active on med list and the sig matches. RN to manually verify dose and sig if red X/fail.     If the protocol passes (green check), you do not need to verify med dose and sig.    A prescription matches if they are the same clinical intention.    For Example: once daily and every morning are the same.    The protocol can not identify upper and lower case letters as matching and will fail.     For Example: Take 1 tablet (50 mg) by mouth daily     TAKE 1 TABLET (50 MG) BY MOUTH DAILY    For all fails (red x), verify dose and sig.    If the refill does match what is on file, the RN can still proceed to approve the refill request.       If they do not match, route to the appropriate provider.             Passed - Patient is 18 years of age or older        Passed - No active  pregnancy on record        Passed - No positive pregnancy test on record in past 12 months           Refill sent 6/9/25 90 capsules with 3 refills  Laura Aguila RN on 6/9/2025 at 4:22 PM

## 2025-06-09 NOTE — PROGRESS NOTES
"Sil Mancuso 54 year old  No LMP recorded (lmp unknown). (Menstrual status: Birth Control).     CC: Perimenopause  consult     She has been experiencing:  - fatigue  -wt gain  -brain fog  -insomnia  - Mild VMS    Menstrual status  Pt has been using Nuva Ring in a continuous fashion and has not had a menses for several years.    Pt has now developed HTN on Losartan    PMH   Past Medical History:   Diagnosis Date    Anxiety     Depression     Disease of thyroid gland     H/O LEEP 2021    Per 2014 pap report, has history of a LEEP (date/dx unknown) 14 NIL pap, neg HPV 18 ASCUS, neg HPV 21 NIL pap, neg HPV. Plan: await provider    Hypertension 2017    Stage 1    Irritable bowel syndrome 3/17/2015     PSH   Past Surgical History:   Procedure Laterality Date    LEEP TX, CERVICAL      LUMBAR LAMINECTOMY      SPINE SURGERY         Hormone use Nuva Ring   Denies breast CA, clotting disorder/history,  smoking or migraine with aura     Mammogram 2024   Colon screening   Pap smear 2021 (due, done today)   Lipid screening 25    AHCVD risk 2.0% (low risk)     BP (!) 139/91 (BP Location: Right arm, Patient Position: Sitting, Cuff Size: Adult Regular)   Pulse 87   Ht 1.588 m (5' 2.5\")   Wt 68.1 kg (150 lb 1.6 oz)   LMP  (LMP Unknown)   SpO2 98%   BMI 27.02 kg/m    BP Readings from Last 4 Encounters:   25 (!) 139/91   25 126/87   24 129/87   10/31/24 (!) 140/90         A/P:  (Z12.4) Pap smear for cervical cancer screening  (primary encounter diagnosis)  Comment:   Plan: HPV and Gynecologic Cytology Panel -         Recommended Age 30-65 Years            (N95.1) Menopausal syndrome (hot flashes)  Comment:   Plan:     (Z78.0) Menopause present  Comment:   Plan: estradiol (VIVELLE-DOT) 0.05 MG/24HR bi-weekly         patch, progesterone (PROMETRIUM) 100 MG capsule              Menopause   -Discussed physiology and resources for perimenopause/menopause and given " AVS  -STRAW + 10 stage  +1a/ +1b (within 2 yrs of menopause)  -Discussed MHT, that systemic estrogen therapy is the gold standard and FDA approved for treating vasomotor symptoms (hot flashes/night sweats) and osteoporosis while local estrogen therapy is FDA approved to treat genitourinary syndrome of menopause (GSM).  When using systemic estrogen MHT there is a rare increased risk of breast cancer, but considered protective for bone health, cardiovascular health, brain health, and decreasing all cause mortality.  The Menopause Society recommends starting MHT within 10 yrs of menopause and before age 60 yr/old.  -Reviewed the difference in levels of estrogen between contraceptives which is four times the dose of the estrogen in menopause hormone therapy   -Discussed the need for progesterone to balance the estrogen and prevent the risk of endometrial hyperplasia and uterine CA.   -Reference - The NAMS 2022 Position Statement on Hormone Therapy     -Discussed recommendation for 3 month med chick in after MHT start and then annually to assess need and risks/bens.    45 minutes spent on the date of the encounter doing chart review, history and exam, documentation and further activities per the note

## 2025-06-09 NOTE — PATIENT INSTRUCTIONS
"Dear Sil     It was nice to meet you today.    The perimenopause and menopause resources I mentioned were     A book Hot and Bothered by Vick Contreras  A podcast Ciara Gallegos # [phone], \"The # 1 Menopause Doctor: How to loose belly fast, Sleep .....\" Guest Dr Terri Harman   The book The New Rules of Menopause; A [facility] Guide to Perimenopause and Beyond by  Dr Cristy Ruelas    Many people ask about the book The New [place] Pause By Dr Terri Harman.  I think she does a nice job describing what happens to the body during menopause and menopause hormone therapy, but she also recommends many expensive tests and expensive supplements that are not part of the standard guidelines in menopause treatment      Things you can do today to feel good    Drink at least 64 - 104 ounces of water a day.  Staying hydrated helps your body run smoother, dehydration increases headaches and fatigue   Eat 5 servings of produce a day  Real, unprocessed foods like produce are high in fiber and natural vitamins and minerals.  Also if you are getting 5 servings you will have less craving and less room for unhealthy  processed foods   Floss you teeth If you do not floss there are many little micro infections in your gums stimulating inflammation   Move / exercise  every day Start with walking like you are late for 20 mins a day, add strength training twice a week   Limit sugar and alcohol Both have extra calories and stimulate inflammation   Manage stress Find what works for you, meditation, yoga, go for a walk, talk to a trusted friend, sing, laugh, pet a dog         Many people ask about supplements.  You will get better nutrition from eating a variety of real unprocessed foods than from any supplements.  But these are the supplements that I think can be helpful during the perimenopause/menopause transition    Vitamin D3, 2,000 international unit(s) every day  Magnesium oxide or magnesium glycinate 400-500 mg every night before bed " (if it causes diarrhea switch to another form or another brand)   Fish oil / omega 3 2,000 - 2,400 mg / day          The Stages of Reproductive Aging Workshop                         Hormones in the Female Menstrual Cycle             Hormone Levels in the Years Before and After Menopause      Things to Know About Hormone Therapy     Menopause is a normal life event for women - it is not an illness or medical condition.  Every person's experience of menopause is different.  Some women have many symptoms that interfere with life, and others may not experience any charge other than their period stops.    Many women with symptoms often suffer in silence and do not realize how effective and safe hormone therapy can be to improving their symptoms and quality of life.  Most common symptoms  Hot flashes  Night Sweats  Painful sex and/or dry vagina    The most effective way to treat hot flashes, night sweats and painful dry vagina is with hormone therapy.      There are three categories of hormone therapy    If you are still having periods, and it is safe for you, a low dose birth control pill with both estrogen and progesterone can work very well to provide contraception and relieve symptoms.      Estrogen Therapy or ET  means estrogen only therapy.  Estrogen is the hormone that provides the most menopausal symptom relief.  It is a much lower dose than used in a low dose birth control pill.  Estrogen only therapy is only for women who had had their uterus removed with a surgery.    Estrogen Progesterone Therapy or EPT means taking both estrogen and progesterone.  The progesterone protects the uterus from developing uterine cancer from estrogen alone.  This can be from taking a pill, having a Kyleena or Mirena IUD or using a combined product      There are two basic ways to use hormone therapy:    Systemic which means to circulate in the blood stream to all parts of the body.  This is given either as an oral tablet, a patch,  a vaginal ring, or a vaginal cream.  Systemic hormones are used to treat hot flashes and night sweats     Local which means the product only affects a specific or localized area of the body.  This can be given as a cream, a ring or a tablet and is used to treat vaginal symptoms of menopause.  Less risk because the blood level of estrogen and progesterone is not increasing    Duggan time for using systemic hormone therapy    You are having hot flashes and/or night sweats  You are within 10 years of menopause and under age 60    Benefits of using systemic hormone therapy may include    Effectively treats hot flashes, night sweats and vaginal dryness and pain  Helps prevent bone loss  May improve mood swings  May improve sex drive or low libido   Helps reduce risk of future cardiovascular disease, type 2 Diabetes, osteoarthritis, and dementia when used within 10 yrs of menopause     Risks of using systemic hormone therapy     Increased risk of developing a clot, rare, and less increase when using a patch  Increased risk of breast cancer, about the same increase in risks as having a glass or two of wine each night or being overweight.  When using Prometrium, the same progesterone hormone the ovary makes, there is less of an increase in risk     Side Effects     You may have vaginal bleeding again initially, if bleeding persists beyond 6 months please let us know so we can screen you for uterine cancer  Breast tenderness     For more information about Menopause    A book Hot and Bothered by Vick Contreras  A book The New [place] Pause by Dr Terri Harman  A podcast Ciara Grimaldo 'From PMS to Menopause: How to Hack Your Hormones'   The book The New Rules of Menopause; A [facility] Guide to Perimenopause and Beyond by Dr Cristy Ruelas  The [employer] Menopause Society   The [facility] - Menopause       References:   Menopause Practice; A Clinician's Guide, 6th Edition, The [employer] Menopause Society   The New  Rules of Menopause; A [facility] Guide to Perimenopause and Beyond, Cristy Ruelas M.D., M.B.A., Director of [facility]     Compiled by Sobeida FOLEY CNM, MSCP (Menopause Society Certified Practitioner) 1/9/2024

## 2025-06-10 LAB
HPV HR 12 DNA CVX QL NAA+PROBE: NEGATIVE
HPV16 DNA CVX QL NAA+PROBE: NEGATIVE
HPV18 DNA CVX QL NAA+PROBE: NEGATIVE
HUMAN PAPILLOMA VIRUS FINAL DIAGNOSIS: NORMAL

## 2025-06-11 ENCOUNTER — RESULTS FOLLOW-UP (OUTPATIENT)
Dept: OBGYN | Facility: CLINIC | Age: 55
End: 2025-06-11

## 2025-06-12 PROBLEM — Z98.890 H/O LEEP: Status: ACTIVE | Noted: 2021-12-30

## 2025-06-12 LAB
BKR AP ASSOCIATED HPV REPORT: NORMAL
BKR LAB AP GYN ADEQUACY: NORMAL
BKR LAB AP GYN INTERPRETATION: NORMAL
BKR LAB AP PREVIOUS ABNORMAL: NORMAL
PATH REPORT.COMMENTS IMP SPEC: NORMAL
PATH REPORT.COMMENTS IMP SPEC: NORMAL
PATH REPORT.RELEVANT HX SPEC: NORMAL

## 2025-06-25 ENCOUNTER — TELEPHONE (OUTPATIENT)
Dept: FAMILY MEDICINE | Facility: CLINIC | Age: 55
End: 2025-06-25

## 2025-07-08 ENCOUNTER — MYC REFILL (OUTPATIENT)
Dept: FAMILY MEDICINE | Facility: CLINIC | Age: 55
End: 2025-07-08
Payer: COMMERCIAL

## 2025-07-08 DIAGNOSIS — F32.5 MAJOR DEPRESSION IN REMISSION: ICD-10-CM

## 2025-07-08 DIAGNOSIS — F41.1 ANXIETY STATE: ICD-10-CM

## 2025-07-08 RX ORDER — SERTRALINE HYDROCHLORIDE 100 MG/1
150 TABLET, FILM COATED ORAL
Qty: 135 TABLET | Refills: 1 | OUTPATIENT
Start: 2025-07-08

## 2025-07-08 RX ORDER — BUPROPION HYDROCHLORIDE 300 MG/1
300 TABLET ORAL EVERY MORNING
Qty: 90 TABLET | Refills: 1 | OUTPATIENT
Start: 2025-07-08

## 2025-07-22 DIAGNOSIS — G47.09 OTHER INSOMNIA: ICD-10-CM

## 2025-07-22 RX ORDER — ETONOGESTREL AND ETHINYL ESTRADIOL .12; .015 MG/D; MG/D
RING VAGINAL
Refills: 3 | OUTPATIENT
Start: 2025-07-22

## 2025-08-26 ENCOUNTER — MYC MEDICAL ADVICE (OUTPATIENT)
Dept: MIDWIFE SERVICES | Facility: CLINIC | Age: 55
End: 2025-08-26
Payer: COMMERCIAL

## 2025-08-26 DIAGNOSIS — N95.1 MENOPAUSAL SYNDROME (HOT FLASHES): ICD-10-CM

## 2025-08-26 RX ORDER — ESTRADIOL 0.1 MG/D
1 FILM, EXTENDED RELEASE TRANSDERMAL
Qty: 24 PATCH | Refills: 3 | Status: SHIPPED | OUTPATIENT
Start: 2025-08-28

## 2025-08-26 RX ORDER — PROGESTERONE 100 MG/1
300 CAPSULE ORAL DAILY
Qty: 180 CAPSULE | Refills: 5 | Status: SHIPPED | OUTPATIENT
Start: 2025-08-26